# Patient Record
Sex: FEMALE | Race: WHITE | NOT HISPANIC OR LATINO | Employment: FULL TIME | URBAN - NONMETROPOLITAN AREA
[De-identification: names, ages, dates, MRNs, and addresses within clinical notes are randomized per-mention and may not be internally consistent; named-entity substitution may affect disease eponyms.]

---

## 2017-01-03 ENCOUNTER — OFFICE VISIT (OUTPATIENT)
Dept: FAMILY MEDICINE CLINIC | Facility: CLINIC | Age: 47
End: 2017-01-03

## 2017-01-03 VITALS
SYSTOLIC BLOOD PRESSURE: 120 MMHG | DIASTOLIC BLOOD PRESSURE: 80 MMHG | BODY MASS INDEX: 40.12 KG/M2 | HEART RATE: 76 BPM | WEIGHT: 235 LBS | HEIGHT: 64 IN

## 2017-01-03 DIAGNOSIS — F41.1 GENERALIZED ANXIETY DISORDER: Chronic | ICD-10-CM

## 2017-01-03 DIAGNOSIS — F33.1 MODERATE EPISODE OF RECURRENT MAJOR DEPRESSIVE DISORDER (HCC): Primary | Chronic | ICD-10-CM

## 2017-01-03 PROCEDURE — 99214 OFFICE O/P EST MOD 30 MIN: CPT | Performed by: INTERNAL MEDICINE

## 2017-01-03 RX ORDER — ARIPIPRAZOLE 2 MG/1
2 TABLET ORAL DAILY
Qty: 30 TABLET | Refills: 3 | Status: SHIPPED | OUTPATIENT
Start: 2017-01-03 | End: 2017-01-23 | Stop reason: SDUPTHER

## 2017-01-03 RX ORDER — VALACYCLOVIR HYDROCHLORIDE 500 MG/1
TABLET, FILM COATED ORAL
COMMUNITY
Start: 2016-12-11 | End: 2017-05-09 | Stop reason: ALTCHOICE

## 2017-01-03 RX ORDER — DESVENLAFAXINE 50 MG/1
50 TABLET, EXTENDED RELEASE ORAL DAILY
Qty: 30 TABLET | Refills: 5 | Status: SHIPPED | OUTPATIENT
Start: 2017-01-03 | End: 2017-05-25 | Stop reason: SDUPTHER

## 2017-01-03 RX ORDER — LORATADINE 10 MG/1
1 TABLET ORAL DAILY PRN
COMMUNITY
End: 2020-03-12

## 2017-01-03 RX ORDER — TRAZODONE HYDROCHLORIDE 50 MG/1
50 TABLET ORAL NIGHTLY
Qty: 30 TABLET | Refills: 5 | Status: SHIPPED | OUTPATIENT
Start: 2017-01-03 | End: 2017-11-09 | Stop reason: SDUPTHER

## 2017-01-03 RX ORDER — NITROFURANTOIN MACROCRYSTALS 100 MG/1
1 CAPSULE ORAL DAILY
COMMUNITY
Start: 2016-12-01 | End: 2017-03-06

## 2017-01-03 NOTE — PROGRESS NOTES
Subjective   My Hart is a 46 y.o. female who presents to the office for follow-up.  She has anxiety and depression.  She has been having increased episodes of depression and panic attacks.  When I last saw her in October, I started her on fluoxetine and discontinued Wellbutrin.  She also started taking trazodone at bedtime.  She then started seeing a counselor at mental [facility].    She brings in [employer] papers today for completion.  She stopped working on 12/14/2016 she has not yet returned to work.  She continues to have panic attacks.  She does not want to leave the house.  Her family members had to complete her [employer] shopping.  She denies any suicidal or homicidal ideation    History of Present Illness     The following portions of the patient's history were reviewed and updated as appropriate: allergies, current medications, past family history, past medical history, past social history, past surgical history and problem list.    Review of Systems   Constitutional: Negative for chills, fatigue and fever.   HENT: Negative for congestion, sneezing, sore throat and trouble swallowing.    Eyes: Negative for visual disturbance.   Respiratory: Negative for cough, chest tightness, shortness of breath and wheezing.    Cardiovascular: Negative for chest pain, palpitations and leg swelling.   Gastrointestinal: Negative for abdominal pain, constipation, diarrhea, nausea and vomiting.   Genitourinary: Negative for dysuria, frequency and urgency.   Musculoskeletal: Negative for neck pain.   Skin: Negative for rash.   Neurological: Negative for dizziness, weakness and headaches.   Psychiatric/Behavioral: Positive for decreased concentration and sleep disturbance. Negative for suicidal ideas. The patient is nervous/anxious.    All other systems reviewed and are negative.      Objective   Physical Exam   Constitutional: She is oriented to person, place, and time. She appears well-developed and well-nourished.    HENT:   Head: Normocephalic and atraumatic.   Nose: Nose normal.   Mouth/Throat: Oropharynx is clear and moist. No oropharyngeal exudate.   Eyes: Conjunctivae and EOM are normal. Pupils are equal, round, and reactive to light. Right eye exhibits no discharge. Left eye exhibits no discharge. No scleral icterus.   Neck: Normal range of motion. Neck supple. No thyromegaly present.   Cardiovascular: Normal rate, regular rhythm, normal heart sounds and intact distal pulses.  Exam reveals no gallop and no friction rub.    No murmur heard.  Pulmonary/Chest: Effort normal and breath sounds normal. No respiratory distress. She has no wheezes. She has no rales.   Abdominal: Soft. Bowel sounds are normal. She exhibits no distension. There is no tenderness. There is no rebound and no guarding.   Musculoskeletal: She exhibits no edema.   Lymphadenopathy:     She has no cervical adenopathy.   Neurological: She is alert and oriented to person, place, and time. No cranial nerve deficit.   Skin: Skin is warm and dry. No rash noted.   Psychiatric: She has a normal mood and affect. Her behavior is normal. Judgment and thought content normal.   Nursing note and vitals reviewed.      Assessment/Plan   My was seen today for depression.    Diagnoses and all orders for this visit:    Moderate episode of recurrent major depressive disorder  Comments:  UNCONTROLLED  Orders:  -     desvenlafaxine (PRISTIQ) 50 MG 24 hr tablet; Take 1 tablet by mouth Daily.  -     ARIPiprazole (ABILIFY) 2 MG tablet; Take 1 tablet by mouth Daily.  -     traZODone (DESYREL) 50 MG tablet; Take 1 tablet by mouth Every Night.    Generalized anxiety disorder  Comments:  UNCONTROLLED  Orders:  -     desvenlafaxine (PRISTIQ) 50 MG 24 hr tablet; Take 1 tablet by mouth Daily.  -     ARIPiprazole (ABILIFY) 2 MG tablet; Take 1 tablet by mouth Daily.  -     traZODone (DESYREL) 50 MG tablet; Take 1 tablet by mouth Every Night.            I will discontinue the  fluoxetine and start her on Pristiq.  She will continue with trazodone.  I will also start a low-dose of Abilify.  She will continue to follow with mental health.  I will complete FMLA papers secondary to the depression.  Hopefully by adding Abilify, she get back to work within a couple of weeks.  I will see her in 2 weeks for follow-up. She will let me know if her symptoms worsen.    Patients BMI indicates that she is overweight.  I discussed the importance of weight loss, and have recommended a diet and exercise regimen.    PHQ-9 Depression Screen was performed on patient with a score of 17.

## 2017-01-03 NOTE — PATIENT INSTRUCTIONS

## 2017-01-03 NOTE — MR AVS SNAPSHOT
My Hart   1/3/2017 10:00 AM   Office Visit    Dept Phone:  [phone]   Encounter #:  [id number]    Provider:  Guillermo Caal MD   Department:  [facility] PRIMARY CARE [place]                Your Full Care Plan              Today's Medication Changes          These changes are accurate as of: 1/3/17 10:45 AM.  If you have any questions, ask your nurse or doctor.               New Medication(s)Ordered:     ARIPiprazole 2 MG tablet   Commonly known as:  ABILIFY   Take 1 tablet by mouth Daily.   Started by:  Guillermo Caal MD       desvenlafaxine 50 MG 24 hr tablet   Commonly known as:  PRISTIQ   Take 1 tablet by mouth Daily.   Started by:  Guillermo Caal MD         Medication(s)that have changed:     loratadine 10 MG tablet   Commonly known as:  CLARITIN   Take 1 tablet by mouth Daily.   What changed:  Another medication with the same name was removed. Continue taking this medication, and follow the directions you see here.   Changed by:  Guillermo Caal MD         Stop taking medication(s)listed here:     buPROPion  MG 24 hr tablet   Commonly known as:  WELLBUTRIN XL   Stopped by:  Guillermo Caal MD           FLUoxetine 40 MG capsule   Commonly known as:  PROZAC   Stopped by:  Guillermo Caal MD           sulfamethoxazole-trimethoprim 800-160 MG per tablet   Commonly known as:  BACTRIM DS,SEPTRA DS   Stopped by:  Guillermo Caal MD                Where to Get Your Medications      These medications were sent to [address] [phone] [facility] [phone] [address] [address]     Phone:  [phone]     ARIPiprazole 2 MG tablet    desvenlafaxine 50 MG 24 hr tablet    traZODone 50 MG tablet                  Your Updated Medication List          This list is accurate as of: 1/3/17 10:45 AM.  Always use your most recent med list.                ARIPiprazole 2 MG tablet   Commonly known as:  ABILIFY   Take 1 tablet by mouth Daily.       desvenlafaxine 50 MG 24 hr tablet   Commonly known as:  PRISTIQ   Take 1 tablet by mouth Daily.       fluticasone 50 MCG/ACT nasal spray   Commonly known as:  FLONASE       levothyroxine 25 MCG tablet   Commonly known as:  SYNTHROID, LEVOTHROID   Take 1 tablet by mouth daily.       loratadine 10 MG tablet   Commonly known as:  CLARITIN       MUCINEX 600 MG 12 hr tablet   Generic drug:  guaiFENesin       nitrofurantoin 100 MG capsule   Commonly known as:  MACRODANTIN       omeprazole 20 MG capsule   Commonly known as:  priLOSEC   Take 1 capsule by mouth daily.       traZODone 50 MG tablet   Commonly known as:  DESYREL   Take 1 tablet by mouth Every Night.       valACYclovir 500 MG tablet   Commonly known as:  VALTREX               You Were Diagnosed With        Codes Comments    Moderate episode of recurrent major depressive disorder    -  Primary ICD-10-CM: F33.1  ICD-9-CM: 296.32     Generalized anxiety disorder     ICD-10-CM: F41.1  ICD-9-CM: 300.02       Instructions    Exercising to Lose Weight  Exercising can help you to lose weight. In order to lose weight through exercise, you need to do vigorous-intensity exercise. You can tell that you are exercising with vigorous intensity if you are breathing very hard and fast and cannot hold a conversation while exercising.  Moderate-intensity exercise helps to maintain your current weight. You can tell that you are exercising at a moderate level if you have a higher heart rate and faster breathing, but you are still able to hold a conversation.  HOW OFTEN SHOULD I EXERCISE?  Choose an activity that you enjoy and set realistic goals. Your health care provider can help you to make an activity plan that works for you. Exercise regularly as directed by your health care provider. This may include:  · Doing resistance training twice each week, such as:    Push-ups.    Sit-ups.     Lifting weights.    Using resistance bands.  · Doing a given intensity of exercise for a given amount of time. Choose from these options:    150 minutes of moderate-intensity exercise every week.    75 minutes of vigorous-intensity exercise every week.    A mix of moderate-intensity and vigorous-intensity exercise every week.  Children, pregnant women, people who are out of shape, people who are overweight, and older adults may need to consult a health care provider for individual recommendations. If you have any sort of medical condition, be sure to consult your health care provider before starting a new exercise program.  WHAT ARE SOME ACTIVITIES THAT CAN HELP ME TO LOSE WEIGHT?   · Walking at a rate of at least 4.5 miles an hour.  · Jogging or running at a rate of 5 miles per hour.  · Biking at a rate of at least 10 miles per hour.  · Lap swimming.  · Roller-skating or in-line skating.  · Cross-country skiing.  · Vigorous competitive sports, such as football, basketball, and soccer.  · Jumping rope.  · Aerobic dancing.  HOW CAN I BE MORE ACTIVE IN MY DAY-TO-DAY ACTIVITIES?  · Use the stairs instead of the elevator.  · Take a walk during your lunch break.  · If you drive, park your car farther away from work or school.  · If you take public transportation, get off one stop early and walk the rest of the way.  · Make all of your phone calls while standing up and walking around.  · Get up, stretch, and walk around every 30 minutes throughout the day.  WHAT GUIDELINES SHOULD I FOLLOW WHILE EXERCISING?  · Do not exercise so much that you hurt yourself, feel dizzy, or get very short of breath.  · Consult your health care provider prior to starting a new exercise program.  · Wear comfortable clothes and shoes with good support.  · Drink plenty of water while you exercise to prevent dehydration or heat stroke. Body water is lost during exercise and must be replaced.  · Work out until you breathe faster and your heart  "beats faster.     This information is not intended to replace advice given to you by your health care provider. Make sure you discuss any questions you have with your health care provider.     Document Released: 01/20/2012 Document Revised: 01/08/2016 Document Reviewed: 05/21/2015  [employer] Interactive Patient Education ©2016 [employer]       Patient Instructions History      Upcoming Appointments     Visit Type Date Time Department    OFFICE VISIT 1/3/2017 10:00 AM [facility]    FOLLOW UP 1/17/2017  8:30 AM [facility]      MyChart Signup     Our records indicate that you have an active "[employer] account.    You can view your After Visit Summary by going to [employer] and logging in with your Im[employer] username and password.  If you don't have a [employer] username and password but a parent or guardian has access to your record, the parent or guardian should login with their own Im[employer] username and password and access your record to view the After Visit Summary.    If you have questions, you can email [email or web address] or call [phone] to talk to our Imagimod staff.  Remember, Imagimod is NOT to be used for urgent needs.  For medical emergencies, dial 911.               Other Info from Your Visit           Your Appointments     Jan 17, 2017  8:30 AM CST   Follow Up with Guillermo Caal MD   [facility]    [address] [place] [address] [address] [place] [address] [phone]           Arrive 15 minutes prior to appointment.              Allergies     No Known Allergies      Reason for Visit     Depression seeing Lay Powell at [facility]      Vital Signs     Blood Pressure Pulse Height Weight Body Mass Index Smoking Status    120/80 (BP Location: Left arm, Patient Position: Sitting, Cuff Size: Large Adult) 76 64\" (162.6 cm) 235 lb (107 kg) 40.34 kg/m2 Former Smoker      Problems and Diagnoses Noted     Generalized anxiety disorder    " Mood problem

## 2017-01-12 ENCOUNTER — TELEPHONE (OUTPATIENT)
Dept: FAMILY MEDICINE CLINIC | Facility: CLINIC | Age: 47
End: 2017-01-12

## 2017-01-12 NOTE — TELEPHONE ENCOUNTER
"Patient phoned, she is requesting to return to work this coming Monday 01/16/2017. Stated, \" I know I have an appt with  on Tuesday but the medication is working and I would like to go back on Monday, and see him still on Tuesday\".  "

## 2017-01-16 ENCOUNTER — OFFICE VISIT (OUTPATIENT)
Dept: FAMILY MEDICINE CLINIC | Facility: CLINIC | Age: 47
End: 2017-01-16

## 2017-01-16 VITALS
TEMPERATURE: 97.2 F | BODY MASS INDEX: 39.95 KG/M2 | DIASTOLIC BLOOD PRESSURE: 70 MMHG | HEIGHT: 64 IN | HEART RATE: 80 BPM | SYSTOLIC BLOOD PRESSURE: 110 MMHG | WEIGHT: 234 LBS

## 2017-01-16 DIAGNOSIS — J06.9 ACUTE URI: ICD-10-CM

## 2017-01-16 DIAGNOSIS — F33.41 RECURRENT MAJOR DEPRESSIVE DISORDER, IN PARTIAL REMISSION (HCC): Primary | Chronic | ICD-10-CM

## 2017-01-16 DIAGNOSIS — F41.1 GENERALIZED ANXIETY DISORDER: Chronic | ICD-10-CM

## 2017-01-16 DIAGNOSIS — H69.83 EUSTACHIAN TUBE DYSFUNCTION, BILATERAL: ICD-10-CM

## 2017-01-16 PROBLEM — N39.0 CHRONIC URINARY TRACT INFECTION: Chronic | Status: ACTIVE | Noted: 2017-01-16

## 2017-01-16 PROCEDURE — 99214 OFFICE O/P EST MOD 30 MIN: CPT | Performed by: INTERNAL MEDICINE

## 2017-01-16 RX ORDER — CEFDINIR 300 MG/1
300 CAPSULE ORAL 2 TIMES DAILY
Qty: 20 CAPSULE | Refills: 0 | Status: SHIPPED | OUTPATIENT
Start: 2017-01-16 | End: 2017-03-06

## 2017-01-16 NOTE — MR AVS SNAPSHOT
My Hart   1/16/2017 2:00 PM   Office Visit    Dept Phone:  [phone]   Encounter #:  [id number]    Provider:  Guillermo Caal MD   Department:  [facility] PRIMARY CARE [place]                Your Full Care Plan              Today's Medication Changes          These changes are accurate as of: 1/16/17  2:42 PM.  If you have any questions, ask your nurse or doctor.               New Medication(s)Ordered:     cefdinir 300 MG capsule   Commonly known as:  OMNICEF   Take 1 capsule by mouth 2 (Two) Times a Day.   Started by:  Guillermo Caal MD            Where to Get Your Medications      These medications were sent to [facility] - [address] [place] [facility]  - [phone]  - [phone] [address] [place] [address]     Phone:  [phone]     cefdinir 300 MG capsule                  Your Updated Medication List          This list is accurate as of: 1/16/17  2:42 PM.  Always use your most recent med list.                ARIPiprazole 2 MG tablet   Commonly known as:  ABILIFY   Take 1 tablet by mouth Daily.       cefdinir 300 MG capsule   Commonly known as:  OMNICEF   Take 1 capsule by mouth 2 (Two) Times a Day.       desvenlafaxine 50 MG 24 hr tablet   Commonly known as:  PRISTIQ   Take 1 tablet by mouth Daily.       fluticasone 50 MCG/ACT nasal spray   Commonly known as:  FLONASE       levothyroxine 25 MCG tablet   Commonly known as:  SYNTHROID, LEVOTHROID   Take 1 tablet by mouth daily.       loratadine 10 MG tablet   Commonly known as:  CLARITIN       MUCINEX 600 MG 12 hr tablet   Generic drug:  guaiFENesin       nitrofurantoin 100 MG capsule   Commonly known as:  MACRODANTIN       omeprazole 20 MG capsule   Commonly known as:  priLOSEC   Take 1 capsule by mouth daily.       traZODone 50 MG tablet   Commonly known as:  DESYREL   Take 1 tablet by mouth Every Night.       valACYclovir 500 MG tablet   Commonly known  as:  VALTREX               You Were Diagnosed With        Codes Comments    Recurrent major depressive disorder, in partial remission    -  Primary ICD-10-CM: F33.41  ICD-9-CM: 296.35     Generalized anxiety disorder     ICD-10-CM: F41.1  ICD-9-CM: 300.02     Eustachian tube dysfunction, bilateral     ICD-10-CM: H69.83  ICD-9-CM: 381.81     Acute URI     ICD-10-CM: J06.9  ICD-9-CM: 465.9       Instructions    Exercising to Lose Weight  Exercising can help you to lose weight. In order to lose weight through exercise, you need to do vigorous-intensity exercise. You can tell that you are exercising with vigorous intensity if you are breathing very hard and fast and cannot hold a conversation while exercising.  Moderate-intensity exercise helps to maintain your current weight. You can tell that you are exercising at a moderate level if you have a higher heart rate and faster breathing, but you are still able to hold a conversation.  HOW OFTEN SHOULD I EXERCISE?  Choose an activity that you enjoy and set realistic goals. Your health care provider can help you to make an activity plan that works for you. Exercise regularly as directed by your health care provider. This may include:  · Doing resistance training twice each week, such as:    Push-ups.    Sit-ups.    Lifting weights.    Using resistance bands.  · Doing a given intensity of exercise for a given amount of time. Choose from these options:    150 minutes of moderate-intensity exercise every week.    75 minutes of vigorous-intensity exercise every week.    A mix of moderate-intensity and vigorous-intensity exercise every week.  Children, pregnant women, people who are out of shape, people who are overweight, and older adults may need to consult a health care provider for individual recommendations. If you have any sort of medical condition, be sure to consult your health care provider before starting a new exercise program.  WHAT ARE SOME ACTIVITIES THAT CAN HELP  ME TO LOSE WEIGHT?   · Walking at a rate of at least 4.5 miles an hour.  · Jogging or running at a rate of 5 miles per hour.  · Biking at a rate of at least 10 miles per hour.  · Lap swimming.  · Roller-skating or in-line skating.  · Cross-country skiing.  · Vigorous competitive sports, such as football, basketball, and soccer.  · Jumping rope.  · Aerobic dancing.  HOW CAN I BE MORE ACTIVE IN MY DAY-TO-DAY ACTIVITIES?  · Use the stairs instead of the elevator.  · Take a walk during your lunch break.  · If you drive, park your car farther away from work or school.  · If you take public transportation, get off one stop early and walk the rest of the way.  · Make all of your phone calls while standing up and walking around.  · Get up, stretch, and walk around every 30 minutes throughout the day.  WHAT GUIDELINES SHOULD I FOLLOW WHILE EXERCISING?  · Do not exercise so much that you hurt yourself, feel dizzy, or get very short of breath.  · Consult your health care provider prior to starting a new exercise program.  · Wear comfortable clothes and shoes with good support.  · Drink plenty of water while you exercise to prevent dehydration or heat stroke. Body water is lost during exercise and must be replaced.  · Work out until you breathe faster and your heart beats faster.     This information is not intended to replace advice given to you by your health care provider. Make sure you discuss any questions you have with your health care provider.     Document Released: 01/20/2012 Document Revised: 01/08/2016 Document Reviewed: 05/21/2015  [employer] Interactive Patient Education ©2016 [employer]       Patient Instructions History      Upcoming Appointments     Visit Type Date Time Department    FOLLOW UP 1/16/2017  2:00 PM [facility] [place] [facility]    FOLLOW UP 2/27/2017  3:00 PM [facility] Signup     Our records indicate that you have an active [employer] account.    You can view your After Visit  "Summary by going to [employer] and logging in with your CompassMD username and password.  If you don't have a CompassMD username and password but a parent or guardian has access to your record, the parent or guardian should login with their own CompassMD username and password and access your record to view the After Visit Summary.    If you have questions, you can email [employer] [email or web address] or call [phone] to talk to our CompassMD staff.  Remember, CompassMD is NOT to be used for urgent needs.  For medical emergencies, dial 911.               Other Info from Your Visit           Your Appointments     Feb 27, 2017  3:00 PM CST   Follow Up with Guillermo Caal MD   [facility]    [address] [place] [address] [address] [place] [address] [phone]           Arrive 15 minutes prior to appointment.              Allergies     No Known Allergies      Reason for Visit     Depression follow up on new med    Sore Throat     Earache left      Vital Signs     Blood Pressure Pulse Temperature Height Weight Body Mass Index    110/70 (BP Location: Left arm, Patient Position: Sitting, Cuff Size: Large Adult) 80 97.2 °F (36.2 °C) (Oral) 64\" (162.6 cm) 234 lb (106 kg) 40.17 kg/m2    Smoking Status                   Former Smoker           Problems and Diagnoses Noted     Chronic urinary tract infection    Generalized anxiety disorder    Mood problem    Eustachian tube dysfunction        Acute upper respiratory infection            "

## 2017-01-16 NOTE — PATIENT INSTRUCTIONS

## 2017-01-16 NOTE — PROGRESS NOTES
Subjective   My Hart is a 46 y.o. female who presents to the office for follow-up.  I saw HER-2 weeks ago for follow-up on her depression and anxiety.  I discontinued fluoxetine and started Pristiq.  I also started her on a low-dose of Abilify.  She continues to use trazodone to help with insomnia.  She is feeling much better.  She noticed improvement almost immediately upon starting the new medications.  She is planning to return to work tonight.    She also complains of a sore throat and nasal congestion and pressure in the left ear.  This started a few days ago.  History of Present Illness     The following portions of the patient's history were reviewed and updated as appropriate: allergies, current medications, past family history, past medical history, past social history, past surgical history and problem list.    Review of Systems   Constitutional: Negative for chills, fatigue and fever.   HENT: Positive for congestion, ear pain and sore throat. Negative for sneezing and trouble swallowing.    Eyes: Negative for visual disturbance.   Respiratory: Negative for cough, chest tightness, shortness of breath and wheezing.    Cardiovascular: Negative for chest pain, palpitations and leg swelling.   Gastrointestinal: Negative for abdominal pain, constipation, diarrhea, nausea and vomiting.   Genitourinary: Negative for dysuria, frequency and urgency.   Musculoskeletal: Negative for neck pain.   Skin: Negative for rash.   Neurological: Negative for dizziness, weakness and headaches.   Psychiatric/Behavioral:        Patient denies any feelings of depression and has not felt down, hopeless or lost interest in any activities.   All other systems reviewed and are negative.      Objective   Physical Exam   Constitutional: She is oriented to person, place, and time. She appears well-developed and well-nourished.   HENT:   Head: Normocephalic and atraumatic.   Nose: Nose normal.   Mouth/Throat: Oropharynx is  clear and moist. No oropharyngeal exudate.   TMs are retracted bilaterally, with mild effusion, worse on the left.  Nose is congested.  Oropharynx is erythematous with posterior drainage.   Eyes: Conjunctivae and EOM are normal. Pupils are equal, round, and reactive to light. Right eye exhibits no discharge. Left eye exhibits no discharge. No scleral icterus.   Neck: Normal range of motion. Neck supple. No thyromegaly present.   Cardiovascular: Normal rate, regular rhythm, normal heart sounds and intact distal pulses.  Exam reveals no gallop and no friction rub.    No murmur heard.  Pulmonary/Chest: Effort normal and breath sounds normal. No respiratory distress. She has no wheezes. She has no rales.   Abdominal: Soft. Bowel sounds are normal. She exhibits no distension. There is no tenderness. There is no rebound and no guarding.   Musculoskeletal: She exhibits no edema.   Lymphadenopathy:     She has no cervical adenopathy.   Neurological: She is alert and oriented to person, place, and time. No cranial nerve deficit.   Skin: Skin is warm and dry. No rash noted.   Psychiatric: She has a normal mood and affect. Her behavior is normal. Judgment and thought content normal.   Nursing note and vitals reviewed.      Assessment/Plan   My was seen today for depression, sore throat and earache.    Diagnoses and all orders for this visit:    Recurrent major depressive disorder, in partial remission    Generalized anxiety disorder    Eustachian tube dysfunction, bilateral    Acute URI  -     cefdinir (OMNICEF) 300 MG capsule; Take 1 capsule by mouth 2 (Two) Times a Day.         She is given Omnicef for treatment of the URI.  She will continue with Flonase nasal spray and Mucinex for the eustachian tube dysfunction.    PHQ-9 Depression Screen was performed on patient with a score of 3 .  She will continue with Pristiq, Abilify, and trazodone.  I will follow-up with her in one month.  If she is doing well at that time,  may try discontinuing the Abilify.  She will return to work tonight with no restrictions.    Patients BMI indicates that she is overweight.  I discussed the importance of weight loss, and have recommended a diet and exercise regimen.

## 2017-01-23 DIAGNOSIS — E03.9 ACQUIRED HYPOTHYROIDISM: ICD-10-CM

## 2017-01-23 DIAGNOSIS — F41.1 GENERALIZED ANXIETY DISORDER: Chronic | ICD-10-CM

## 2017-01-23 DIAGNOSIS — F33.1 MODERATE EPISODE OF RECURRENT MAJOR DEPRESSIVE DISORDER (HCC): Chronic | ICD-10-CM

## 2017-01-23 RX ORDER — LEVOTHYROXINE SODIUM 0.03 MG/1
25 TABLET ORAL DAILY
Qty: 90 TABLET | Refills: 3 | Status: SHIPPED | OUTPATIENT
Start: 2017-01-23 | End: 2017-05-09 | Stop reason: SDUPTHER

## 2017-01-23 RX ORDER — ARIPIPRAZOLE 2 MG/1
2 TABLET ORAL DAILY
Qty: 90 TABLET | Refills: 3 | Status: SHIPPED | OUTPATIENT
Start: 2017-01-23 | End: 2017-05-25 | Stop reason: SDUPTHER

## 2017-05-08 ENCOUNTER — LAB (OUTPATIENT)
Dept: LAB | Facility: OTHER | Age: 47
End: 2017-05-08

## 2017-05-08 DIAGNOSIS — E78.5 HYPERLIPIDEMIA, UNSPECIFIED HYPERLIPIDEMIA TYPE: ICD-10-CM

## 2017-05-08 DIAGNOSIS — Z79.899 HIGH RISK MEDICATION USE: ICD-10-CM

## 2017-05-08 DIAGNOSIS — E03.9 HYPOTHYROIDISM (ACQUIRED): ICD-10-CM

## 2017-05-08 DIAGNOSIS — E03.9 HYPOTHYROIDISM (ACQUIRED): Primary | ICD-10-CM

## 2017-05-08 LAB
ALBUMIN SERPL-MCNC: 3.7 G/DL (ref 3.2–5.5)
ALBUMIN/GLOB SERPL: 1.3 G/DL (ref 1–3)
ALP SERPL-CCNC: 60 U/L (ref 15–121)
ALT SERPL W P-5'-P-CCNC: 15 U/L (ref 10–60)
ANION GAP SERPL CALCULATED.3IONS-SCNC: 9 MMOL/L (ref 5–15)
AST SERPL-CCNC: 17 U/L (ref 10–60)
BASOPHILS # BLD AUTO: 0.03 10*3/MM3 (ref 0–0.2)
BASOPHILS NFR BLD AUTO: 0.4 % (ref 0–2)
BILIRUB SERPL-MCNC: <0.3 MG/DL (ref 0.2–1)
BILIRUB UR QL STRIP: NEGATIVE
BUN BLD-MCNC: 11 MG/DL (ref 8–25)
BUN/CREAT SERPL: 10 (ref 7–25)
CALCIUM SPEC-SCNC: 8.4 MG/DL (ref 8.4–10.8)
CHLORIDE SERPL-SCNC: 107 MMOL/L (ref 100–112)
CHOLEST SERPL-MCNC: 210 MG/DL (ref 150–200)
CLARITY UR: ABNORMAL
CO2 SERPL-SCNC: 24 MMOL/L (ref 20–32)
COLOR UR: YELLOW
CREAT BLD-MCNC: 1.1 MG/DL (ref 0.4–1.3)
DEPRECATED RDW RBC AUTO: 43.3 FL (ref 36.4–46.3)
EOSINOPHIL # BLD AUTO: 0.21 10*3/MM3 (ref 0–0.7)
EOSINOPHIL NFR BLD AUTO: 2.8 % (ref 0–7)
ERYTHROCYTE [DISTWIDTH] IN BLOOD BY AUTOMATED COUNT: 12.9 % (ref 11.5–14.5)
GFR SERPL CREATININE-BSD FRML MDRD: 54 ML/MIN/1.73 (ref 55–135)
GLOBULIN UR ELPH-MCNC: 2.9 GM/DL (ref 2.5–4.6)
GLUCOSE BLD-MCNC: 101 MG/DL (ref 70–100)
GLUCOSE UR STRIP-MCNC: NEGATIVE MG/DL
HCT VFR BLD AUTO: 38.7 % (ref 35–45)
HDLC SERPL-MCNC: 48 MG/DL (ref 35–100)
HGB BLD-MCNC: 12.8 G/DL (ref 12–15.5)
HGB UR QL STRIP.AUTO: NEGATIVE
KETONES UR QL STRIP: NEGATIVE
LDLC SERPL CALC-MCNC: 138 MG/DL
LDLC/HDLC SERPL: 2.88 {RATIO}
LEUKOCYTE ESTERASE UR QL STRIP.AUTO: NEGATIVE
LYMPHOCYTES # BLD AUTO: 3.11 10*3/MM3 (ref 0.6–4.2)
LYMPHOCYTES NFR BLD AUTO: 40.9 % (ref 10–50)
MCH RBC QN AUTO: 31.1 PG (ref 26.5–34)
MCHC RBC AUTO-ENTMCNC: 33.1 G/DL (ref 31.4–36)
MCV RBC AUTO: 94.2 FL (ref 80–98)
MONOCYTES # BLD AUTO: 0.67 10*3/MM3 (ref 0–0.9)
MONOCYTES NFR BLD AUTO: 8.8 % (ref 0–12)
NEUTROPHILS # BLD AUTO: 3.59 10*3/MM3 (ref 2–8.6)
NEUTROPHILS NFR BLD AUTO: 47.1 % (ref 37–80)
NITRITE UR QL STRIP: NEGATIVE
PH UR STRIP.AUTO: 5.5 [PH] (ref 5.5–8)
PLATELET # BLD AUTO: 329 10*3/MM3 (ref 150–450)
PMV BLD AUTO: 9.5 FL (ref 8–12)
POTASSIUM BLD-SCNC: 4.4 MMOL/L (ref 3.4–5.4)
PROT SERPL-MCNC: 6.6 G/DL (ref 6.7–8.2)
PROT UR QL STRIP: NEGATIVE
RBC # BLD AUTO: 4.11 10*6/MM3 (ref 3.77–5.16)
SODIUM BLD-SCNC: 140 MMOL/L (ref 134–146)
SP GR UR STRIP: 1.02 (ref 1–1.03)
TRIGL SERPL-MCNC: 119 MG/DL (ref 35–160)
UROBILINOGEN UR QL STRIP: ABNORMAL
VLDLC SERPL-MCNC: 23.8 MG/DL
WBC NRBC COR # BLD: 7.61 10*3/MM3 (ref 3.2–9.8)

## 2017-05-08 PROCEDURE — 84443 ASSAY THYROID STIM HORMONE: CPT | Performed by: INTERNAL MEDICINE

## 2017-05-08 PROCEDURE — 84439 ASSAY OF FREE THYROXINE: CPT | Performed by: INTERNAL MEDICINE

## 2017-05-08 PROCEDURE — 80053 COMPREHEN METABOLIC PANEL: CPT | Performed by: INTERNAL MEDICINE

## 2017-05-08 PROCEDURE — 85025 COMPLETE CBC W/AUTO DIFF WBC: CPT | Performed by: INTERNAL MEDICINE

## 2017-05-08 PROCEDURE — 81003 URINALYSIS AUTO W/O SCOPE: CPT | Performed by: INTERNAL MEDICINE

## 2017-05-08 PROCEDURE — 36415 COLL VENOUS BLD VENIPUNCTURE: CPT | Performed by: INTERNAL MEDICINE

## 2017-05-08 PROCEDURE — 80061 LIPID PANEL: CPT | Performed by: INTERNAL MEDICINE

## 2017-05-09 ENCOUNTER — OFFICE VISIT (OUTPATIENT)
Dept: FAMILY MEDICINE CLINIC | Facility: CLINIC | Age: 47
End: 2017-05-09

## 2017-05-09 VITALS
DIASTOLIC BLOOD PRESSURE: 76 MMHG | HEART RATE: 70 BPM | SYSTOLIC BLOOD PRESSURE: 110 MMHG | WEIGHT: 248 LBS | BODY MASS INDEX: 42.34 KG/M2 | HEIGHT: 64 IN

## 2017-05-09 DIAGNOSIS — E03.9 HYPOTHYROIDISM (ACQUIRED): Primary | ICD-10-CM

## 2017-05-09 DIAGNOSIS — E78.2 MIXED HYPERLIPIDEMIA: Chronic | ICD-10-CM

## 2017-05-09 DIAGNOSIS — F33.42 RECURRENT MAJOR DEPRESSIVE DISORDER, IN FULL REMISSION (HCC): Chronic | ICD-10-CM

## 2017-05-09 DIAGNOSIS — R53.83 OTHER FATIGUE: ICD-10-CM

## 2017-05-09 DIAGNOSIS — R73.02 IMPAIRED GLUCOSE TOLERANCE: Primary | Chronic | ICD-10-CM

## 2017-05-09 DIAGNOSIS — E03.9 ACQUIRED HYPOTHYROIDISM: Chronic | ICD-10-CM

## 2017-05-09 DIAGNOSIS — R06.83 SNORING: ICD-10-CM

## 2017-05-09 LAB
T4 FREE SERPL-MCNC: 1.18 NG/DL (ref 0.78–2.19)
TSH SERPL DL<=0.05 MIU/L-ACNC: 6.31 MIU/ML (ref 0.46–4.68)

## 2017-05-09 PROCEDURE — 99214 OFFICE O/P EST MOD 30 MIN: CPT | Performed by: INTERNAL MEDICINE

## 2017-05-09 RX ORDER — LEVOTHYROXINE SODIUM 0.05 MG/1
50 TABLET ORAL DAILY
Qty: 90 TABLET | Refills: 3 | Status: SHIPPED | OUTPATIENT
Start: 2017-05-09 | End: 2017-06-26 | Stop reason: DRUGHIGH

## 2017-05-09 RX ORDER — ACYCLOVIR 400 MG/1
400 TABLET ORAL 2 TIMES DAILY
COMMUNITY
End: 2020-07-28 | Stop reason: SDUPTHER

## 2017-05-09 RX ORDER — NITROFURANTOIN 25; 75 MG/1; MG/1
100 CAPSULE ORAL DAILY
COMMUNITY
End: 2017-08-07 | Stop reason: SDUPTHER

## 2017-05-25 ENCOUNTER — OFFICE VISIT (OUTPATIENT)
Dept: FAMILY MEDICINE CLINIC | Facility: CLINIC | Age: 47
End: 2017-05-25

## 2017-05-25 VITALS
HEIGHT: 64 IN | WEIGHT: 245 LBS | RESPIRATION RATE: 16 BRPM | SYSTOLIC BLOOD PRESSURE: 120 MMHG | BODY MASS INDEX: 41.83 KG/M2 | DIASTOLIC BLOOD PRESSURE: 82 MMHG

## 2017-05-25 DIAGNOSIS — F41.1 GENERALIZED ANXIETY DISORDER: Chronic | ICD-10-CM

## 2017-05-25 DIAGNOSIS — F33.1 MODERATE EPISODE OF RECURRENT MAJOR DEPRESSIVE DISORDER (HCC): Primary | Chronic | ICD-10-CM

## 2017-05-25 PROCEDURE — 99213 OFFICE O/P EST LOW 20 MIN: CPT | Performed by: INTERNAL MEDICINE

## 2017-05-25 RX ORDER — DESVENLAFAXINE 100 MG/1
100 TABLET, EXTENDED RELEASE ORAL DAILY
Qty: 30 TABLET | Refills: 5 | Status: SHIPPED | OUTPATIENT
Start: 2017-05-25 | End: 2020-03-12

## 2017-05-25 RX ORDER — ARIPIPRAZOLE 5 MG/1
5 TABLET ORAL DAILY
Qty: 30 TABLET | Refills: 5 | Status: SHIPPED | OUTPATIENT
Start: 2017-05-25 | End: 2017-06-23 | Stop reason: DRUGHIGH

## 2017-06-23 ENCOUNTER — OFFICE VISIT (OUTPATIENT)
Dept: FAMILY MEDICINE CLINIC | Facility: CLINIC | Age: 47
End: 2017-06-23

## 2017-06-23 ENCOUNTER — LAB (OUTPATIENT)
Dept: LAB | Facility: OTHER | Age: 47
End: 2017-06-23

## 2017-06-23 VITALS
RESPIRATION RATE: 16 BRPM | OXYGEN SATURATION: 100 % | BODY MASS INDEX: 41.83 KG/M2 | HEART RATE: 71 BPM | SYSTOLIC BLOOD PRESSURE: 122 MMHG | DIASTOLIC BLOOD PRESSURE: 80 MMHG | HEIGHT: 64 IN | WEIGHT: 245 LBS

## 2017-06-23 DIAGNOSIS — Z00.00 ENCOUNTER FOR WELLNESS EXAMINATION: Primary | ICD-10-CM

## 2017-06-23 DIAGNOSIS — E78.2 MIXED HYPERLIPIDEMIA: Chronic | ICD-10-CM

## 2017-06-23 DIAGNOSIS — Z00.00 ENCOUNTER FOR WELLNESS EXAMINATION: ICD-10-CM

## 2017-06-23 DIAGNOSIS — E03.9 HYPOTHYROIDISM (ACQUIRED): ICD-10-CM

## 2017-06-23 LAB
BILIRUB UR QL STRIP: NEGATIVE
CLARITY UR: CLEAR
COLOR UR: YELLOW
GLUCOSE UR STRIP-MCNC: NEGATIVE MG/DL
HBV SURFACE AB SER QL: 32.5 (ref 0–4.99)
HBV SURFACE AB SER RIA-ACNC: ABNORMAL
HGB UR QL STRIP.AUTO: NEGATIVE
KETONES UR QL STRIP: NEGATIVE
LEUKOCYTE ESTERASE UR QL STRIP.AUTO: NEGATIVE
NITRITE UR QL STRIP: NEGATIVE
PH UR STRIP.AUTO: 6 [PH] (ref 5.5–8)
PROT UR QL STRIP: NEGATIVE
SP GR UR STRIP: <=1.005 (ref 1–1.03)
TSH SERPL DL<=0.05 MIU/L-ACNC: 4.74 MIU/ML (ref 0.46–4.68)
UROBILINOGEN UR QL STRIP: NORMAL

## 2017-06-23 PROCEDURE — 84443 ASSAY THYROID STIM HORMONE: CPT | Performed by: INTERNAL MEDICINE

## 2017-06-23 PROCEDURE — 99396 PREV VISIT EST AGE 40-64: CPT | Performed by: INTERNAL MEDICINE

## 2017-06-23 PROCEDURE — 86706 HEP B SURFACE ANTIBODY: CPT | Performed by: INTERNAL MEDICINE

## 2017-06-23 RX ORDER — ARIPIPRAZOLE 2 MG/1
2 TABLET ORAL DAILY
COMMUNITY
End: 2017-08-31 | Stop reason: DRUGHIGH

## 2017-06-23 NOTE — PATIENT INSTRUCTIONS

## 2017-06-23 NOTE — PROGRESS NOTES
Subjective   My Hart is a 46 y.o. female who presents to the office for a wellness exam.  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  She brings physical examination form sent to be completed.  She also needs immunization titers checked.  Otherwise she has been feeling well and voices no complaints today.  History of Present Illness     The following portions of the patient's history were reviewed and updated as appropriate: allergies, current medications, past family history, past medical history, past social history, past surgical history and problem list.    Review of Systems   Constitutional: Negative for chills, fatigue and fever.   HENT: Negative for congestion, sneezing, sore throat and trouble swallowing.    Eyes: Negative for visual disturbance.   Respiratory: Negative for cough, chest tightness, shortness of breath and wheezing.    Cardiovascular: Negative for chest pain, palpitations and leg swelling.   Gastrointestinal: Negative for abdominal pain, constipation, diarrhea, nausea and vomiting.   Genitourinary: Negative for dysuria, frequency and urgency.   Musculoskeletal: Negative for neck pain.   Skin: Negative for rash.   Neurological: Negative for dizziness, weakness and headaches.   Psychiatric/Behavioral:        Patient denies any feelings of depression and has not felt down, hopeless or lost interest in any activities.   All other systems reviewed and are negative.      Objective   Physical Exam   Constitutional: She is oriented to person, place, and time. She appears well-developed and well-nourished.   HENT:   Head: Normocephalic and atraumatic.   Nose: Nose normal.   Mouth/Throat: Oropharynx is clear and moist. No oropharyngeal exudate.   Eyes: Conjunctivae and EOM are normal. Pupils are equal, round, and reactive to light. Right eye exhibits no discharge. Left eye exhibits no discharge. No scleral  icterus.   Neck: Normal range of motion. Neck supple. No thyromegaly present.   Cardiovascular: Normal rate, regular rhythm, normal heart sounds and intact distal pulses.  Exam reveals no gallop and no friction rub.    No murmur heard.  Pulmonary/Chest: Effort normal and breath sounds normal. No respiratory distress. She has no wheezes. She has no rales.   Abdominal: Soft. Bowel sounds are normal. She exhibits no distension. There is no tenderness. There is no rebound and no guarding.   Musculoskeletal: She exhibits no edema.   Lymphadenopathy:     She has no cervical adenopathy.   Neurological: She is alert and oriented to person, place, and time. No cranial nerve deficit.   Skin: Skin is warm and dry. No rash noted.   Psychiatric: She has a normal mood and affect. Her behavior is normal. Judgment and thought content normal.   Nursing note and vitals reviewed.      Assessment/Plan   My was seen today for college physical.    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  I will check titer levels for MMR, Varicella and hepatitis B

## 2017-06-26 LAB
MEV IGG SER IA-ACNC: 54.6 AU/ML
MUV IGG SER IA-ACNC: 32.1 AU/ML
RUBV IGG SERPL IA-ACNC: 3.3 INDEX
VZV IGG SER IA-ACNC: 759 INDEX
VZV IGM SER IA-ACNC: <0.91 INDEX (ref 0–0.9)

## 2017-06-26 RX ORDER — LEVOTHYROXINE SODIUM 0.07 MG/1
75 TABLET ORAL DAILY
Qty: 90 TABLET | Refills: 0 | Status: SHIPPED | OUTPATIENT
Start: 2017-06-26 | End: 2017-11-07 | Stop reason: SDUPTHER

## 2017-07-12 ENCOUNTER — CONSULT (OUTPATIENT)
Dept: SLEEP MEDICINE | Facility: HOSPITAL | Age: 47
End: 2017-07-12

## 2017-07-12 VITALS
SYSTOLIC BLOOD PRESSURE: 124 MMHG | DIASTOLIC BLOOD PRESSURE: 80 MMHG | BODY MASS INDEX: 43.02 KG/M2 | HEART RATE: 84 BPM | OXYGEN SATURATION: 97 % | WEIGHT: 252 LBS | HEIGHT: 64 IN

## 2017-07-12 DIAGNOSIS — G47.8 LONG SLEEPER SYNDROME: ICD-10-CM

## 2017-07-12 DIAGNOSIS — F32.A DEPRESSION, UNSPECIFIED DEPRESSION TYPE: ICD-10-CM

## 2017-07-12 DIAGNOSIS — G47.10 HYPERSOMNIA: Primary | ICD-10-CM

## 2017-07-12 PROCEDURE — 99204 OFFICE O/P NEW MOD 45 MIN: CPT | Performed by: INTERNAL MEDICINE

## 2017-07-12 NOTE — PROGRESS NOTES
New patient Sleep Consultation    Encounter Date: 7/12/2017         Patient's PCP: Guillermo Caal MD  Referring provider: Guillermo Caal MD  Reason for consultation: excessive daytime sleepiness    My Hart is a 46 y.o. female who complains of wanting to sleep 12-14 hours per day. She has jerking and snoring that is witnessed and witnessed apneas. She has had this for years. She can take 3 hours naps if left to her devices. She feels well rested afterwards. She was started on trazodone after a depressive episode in fall 2016. She has been off for ~ 1 month without any change in her sleepiness.    Sleep history:    Bed time: 2130  Sleep latency: 5 minutes  Number of times awakens during the night: 2  Wake time: 0330 on work days, 10 am on days off  Estimated total sleep time at night: 7-12 hours   Caffeine intake: 1 pot a day in the morning  Alcohol intake: 2/week  Nap time: everyday if not working  Driving: sleepiness with long drives  She denies abnormal dreams, sleep paralysis, hypnagogic hallucinations, RLS sx, cataplexy symptoms. She had a lot of sleep walking as a child.  Tonsillectomy: No    Weight Issues:  Weight recorded over past 5 years:   unchanged      Past Medical History:   Diagnosis Date   • Allergic    • Allergic rhinitis    • Depression    • GERD (gastroesophageal reflux disease)    • History of colon polyps    • Hyperlipidemia    • Hypothyroidism    • Impaired glucose tolerance    • Non-alcoholic fatty liver disease    • Tuberculosis    • URI (upper respiratory infection)    • UTI (urinary tract infection)      Social History     Social History   • Marital status:      Spouse name: N/A   • Number of children: N/A   • Years of education: N/A     Occupational History   • Not on file.     Social History Main Topics   • Smoking status: Former Smoker     Quit date: 1994   • Smokeless tobacco: Never Used   • Alcohol use Yes      Comment: occasional   • Drug use: No   • Sexual  activity: Defer     Other Topics Concern   • Not on file     Social History Narrative     Family History   Problem Relation Age of Onset   • Heart disease Mother    • Clotting disorder Mother    • Thyroid disease Sister    • Heart disease Brother    • Cancer Maternal Grandmother      lung   • Diabetes Paternal Grandmother    • Thyroid disease Sister    • Heart disease Sister        Smoking history: smoked 1/2 ppds from age 16 until 24  FH of sleep disorders: last of her 3 kids had night terrors, now with sleep walking    Review of Systems:  Pertinent items are noted in HPI.  (+) sleepiness, depression well controlled    Patient advised to discuss any positive ROS with PCP.      Vitals:    07/12/17 1357   BP: 124/80   Pulse: 84   SpO2: 97%     Body mass index is 43.26 kg/(m^2).  [place] - 14    Gen:  No distress, appears stated age, alert, oriented to person, place, and time  Heent:   NC/AT, PERRLA, EOMI, anicteric sclera    External ears normal, OP clear, Mallamati 3    Nose: patent  NECK:  Supple, midline trachea, no palpable goiter  LUNGS: Clear breath sounds bilaterally, nonlabored breathing  CV:  Normal S1/S2, without murmur, no peripheral edema  ABD:  Non tender, no enlarged liver or masses, Bowel sounds not appreciated  EXT:  No cyanosis or clubbing    Past Medical History:   Diagnosis Date   • Allergic    • Allergic rhinitis    • Depression    • GERD (gastroesophageal reflux disease)    • History of colon polyps    • Hyperlipidemia    • Hypothyroidism    • Impaired glucose tolerance    • Non-alcoholic fatty liver disease    • Tuberculosis    • URI (upper respiratory infection)    • UTI (urinary tract infection)        Current Outpatient Prescriptions:   •  acyclovir (ZOVIRAX) 400 MG tablet, Take 400 mg by mouth 2 (Two) Times a Day. Take no more than 5 doses a day., Disp: , Rfl:   •  ARIPiprazole (ABILIFY) 2 MG tablet, Take 2 mg by mouth Daily., Disp: , Rfl:   •  desvenlafaxine (PRISTIQ) 100 MG 24 hr tablet,  Take 1 tablet by mouth Daily., Disp: 30 tablet, Rfl: 5  •  fluticasone (FLONASE) 50 MCG/ACT nasal spray, 1 spray into each nostril daily. Administer 2 sprays in each nostril for each dose., Disp: , Rfl:   •  guaiFENesin (MUCINEX) 600 MG 12 hr tablet, Take 600 mg by mouth Daily As Needed., Disp: , Rfl:   •  levothyroxine (SYNTHROID, LEVOTHROID) 75 MCG tablet, Take 1 tablet by mouth Daily., Disp: 90 tablet, Rfl: 0  •  loratadine (CLARITIN) 10 MG tablet, Take 1 tablet by mouth Daily As Needed., Disp: , Rfl:   •  nitrofurantoin, macrocrystal-monohydrate, (MACROBID) 100 MG capsule, Take 100 mg by mouth Daily., Disp: , Rfl:   •  omeprazole (PriLOSEC) 20 MG capsule, Take 1 capsule by mouth daily., Disp: 90 capsule, Rfl: 3  •  traZODone (DESYREL) 50 MG tablet, Take 1 tablet by mouth Every Night., Disp: 30 tablet, Rfl: 5    ASSESSMENT:  1. Hypersomnia - Possible obstructive sleep apnea vs. Long sleeper vs. Secondary to medication or psychiatric illness  2. Depression - on abilify and pristiq. Well controlled    PLAN:  1. In laboratory polysomnography testing    2. RTC in 1 week after PSG. Consider autocpap if (+)  3. Sleep logs      Boubacar Viveros MD        CC: MD Afua Stanford Brian W, MD

## 2017-07-21 ENCOUNTER — APPOINTMENT (OUTPATIENT)
Dept: SLEEP MEDICINE | Facility: HOSPITAL | Age: 47
End: 2017-07-21
Attending: INTERNAL MEDICINE

## 2017-07-28 ENCOUNTER — HOSPITAL ENCOUNTER (OUTPATIENT)
Dept: SLEEP MEDICINE | Facility: HOSPITAL | Age: 47
Discharge: HOME | End: 2017-07-28
Attending: INTERNAL MEDICINE | Admitting: INTERNAL MEDICINE

## 2017-07-28 VITALS — HEIGHT: 64 IN | BODY MASS INDEX: 43.02 KG/M2 | WEIGHT: 252 LBS

## 2017-07-28 DIAGNOSIS — G47.10 HYPERSOMNIA: ICD-10-CM

## 2017-07-28 PROCEDURE — 95810 POLYSOM 6/> YRS 4/> PARAM: CPT

## 2017-07-28 PROCEDURE — 95810 POLYSOM 6/> YRS 4/> PARAM: CPT | Performed by: INTERNAL MEDICINE

## 2017-08-01 ENCOUNTER — OFFICE VISIT (OUTPATIENT)
Dept: SLEEP MEDICINE | Facility: HOSPITAL | Age: 47
End: 2017-08-01

## 2017-08-01 VITALS
BODY MASS INDEX: 43.36 KG/M2 | SYSTOLIC BLOOD PRESSURE: 124 MMHG | DIASTOLIC BLOOD PRESSURE: 74 MMHG | HEIGHT: 64 IN | WEIGHT: 254 LBS

## 2017-08-01 DIAGNOSIS — G47.33 OSA (OBSTRUCTIVE SLEEP APNEA): Primary | ICD-10-CM

## 2017-08-01 PROCEDURE — 99212 OFFICE O/P EST SF 10 MIN: CPT | Performed by: INTERNAL MEDICINE

## 2017-08-01 NOTE — PROGRESS NOTES
CHIEF COMPLAINT: follow up sleep study results    HPI:The patient is a 46 y.o. female.  She returns to sleep clinic to discuss the results of the recent sleep study.  She had a diagnostic polysomnogram on the night of 07/28/2017.  AHI of 16., REM AHI of 35.1.  INTERVAL MEDICAL HISTORY: no change    MEDICATIONS:   Current Outpatient Prescriptions:   •  acyclovir (ZOVIRAX) 400 MG tablet, Take 400 mg by mouth 2 (Two) Times a Day. Take no more than 5 doses a day., Disp: , Rfl:   •  ARIPiprazole (ABILIFY) 2 MG tablet, Take 2 mg by mouth Daily., Disp: , Rfl:   •  desvenlafaxine (PRISTIQ) 100 MG 24 hr tablet, Take 1 tablet by mouth Daily., Disp: 30 tablet, Rfl: 5  •  fluticasone (FLONASE) 50 MCG/ACT nasal spray, 1 spray into each nostril daily. Administer 2 sprays in each nostril for each dose., Disp: , Rfl:   •  guaiFENesin (MUCINEX) 600 MG 12 hr tablet, Take 600 mg by mouth Daily As Needed., Disp: , Rfl:   •  levothyroxine (SYNTHROID, LEVOTHROID) 75 MCG tablet, Take 1 tablet by mouth Daily., Disp: 90 tablet, Rfl: 0  •  loratadine (CLARITIN) 10 MG tablet, Take 1 tablet by mouth Daily As Needed., Disp: , Rfl:   •  nitrofurantoin, macrocrystal-monohydrate, (MACROBID) 100 MG capsule, Take 100 mg by mouth Daily., Disp: , Rfl:   •  omeprazole (PriLOSEC) 20 MG capsule, Take 1 capsule by mouth daily., Disp: 90 capsule, Rfl: 3  •  traZODone (DESYREL) 50 MG tablet, Take 1 tablet by mouth Every Night., Disp: 30 tablet, Rfl: 5    PHYSICAL EXAM  Vital Signs (last 24 hours)       07/31 0700  -  08/01 0659 08/01 0700  -  08/01 1434   Most Recent    BP     124/74     124/74          Gen:  No distress, appears stated age, alert, oriented to person, place, and time  Heent:   NC/AT, PERRLA, EOMI, anicteric sclera    External ears/nose normal, OP clear, Mallamati 4  NECK:  Supple, midline trachea, no palpable goiter  LUNGS:   EXT:  No cyanosis or clubbing      IMPRESSION AND PLAN:  1.mild Obstructive Sleep Apnea Hypopnea Syndrome with AHI  of 16.1..  The sleep results were discussed in detail with the patient.  The risks of untreated sleep apnea were reviewed.  Treatment options for sleep apnea were discussed. I counseled patient on sleep hygiene, including regular sleep wake schedule and stimulus control therapy, the importance of weight reduction, and abstaining from smoking and alcohol consumption.    Other treatment options including UPPP surgery, LAUP surgery, radiofrequency somnoplasty, nasal 1-way valves, implantable stimulators, and dental appliances. The patient decided to pursue CPAP therapy.  She will follow-up in 2-3 months with a download.  She agrees to return sooner on a prn basis.      All of the patient's questions were answered. She states understanding and agreement with my assessment and plan as above.  Total time 20 min, more than half spent in face to face counseling and coordination of care.           This document has been electronically signed by Boubacar Viveros MD on August 1, 2017   Boubacar Viveros MD        CC: Guillermo Caal MD          No ref. provider found

## 2017-08-02 PROBLEM — Z99.89 OBSTRUCTIVE SLEEP APNEA ON CPAP: Chronic | Status: ACTIVE | Noted: 2017-08-02

## 2017-08-02 PROBLEM — G47.33 OBSTRUCTIVE SLEEP APNEA ON CPAP: Chronic | Status: ACTIVE | Noted: 2017-08-02

## 2017-08-07 ENCOUNTER — TELEPHONE (OUTPATIENT)
Dept: FAMILY MEDICINE CLINIC | Facility: CLINIC | Age: 47
End: 2017-08-07

## 2017-08-07 DIAGNOSIS — N39.0 CHRONIC URINARY TRACT INFECTION: Primary | Chronic | ICD-10-CM

## 2017-08-07 RX ORDER — NITROFURANTOIN 25; 75 MG/1; MG/1
100 CAPSULE ORAL DAILY
Qty: 30 CAPSULE | Refills: 5 | Status: SHIPPED | OUTPATIENT
Start: 2017-08-07 | End: 2020-07-06 | Stop reason: SDUPTHER

## 2017-08-07 NOTE — TELEPHONE ENCOUNTER
"Patient left a voice message \"requesting refills on the macrobid, suppression antibiotic for my UTI's to [facility]\".   "

## 2017-08-31 ENCOUNTER — OFFICE VISIT (OUTPATIENT)
Dept: FAMILY MEDICINE CLINIC | Facility: CLINIC | Age: 47
End: 2017-08-31

## 2017-08-31 VITALS
DIASTOLIC BLOOD PRESSURE: 70 MMHG | HEART RATE: 86 BPM | HEIGHT: 64 IN | WEIGHT: 258 LBS | SYSTOLIC BLOOD PRESSURE: 114 MMHG | BODY MASS INDEX: 44.05 KG/M2

## 2017-08-31 DIAGNOSIS — N39.0 CHRONIC URINARY TRACT INFECTION: Chronic | ICD-10-CM

## 2017-08-31 DIAGNOSIS — F41.1 GENERALIZED ANXIETY DISORDER: Chronic | ICD-10-CM

## 2017-08-31 DIAGNOSIS — F33.1 MODERATE EPISODE OF RECURRENT MAJOR DEPRESSIVE DISORDER (HCC): Primary | Chronic | ICD-10-CM

## 2017-08-31 PROCEDURE — 99214 OFFICE O/P EST MOD 30 MIN: CPT | Performed by: INTERNAL MEDICINE

## 2017-08-31 RX ORDER — ARIPIPRAZOLE 5 MG/1
5 TABLET ORAL DAILY
COMMUNITY
End: 2017-08-31 | Stop reason: SDUPTHER

## 2017-08-31 RX ORDER — ARIPIPRAZOLE 10 MG/1
10 TABLET ORAL DAILY
Qty: 30 TABLET | Refills: 5 | Status: SHIPPED | OUTPATIENT
Start: 2017-08-31 | End: 2017-11-09 | Stop reason: SDUPTHER

## 2017-08-31 RX ORDER — BUPROPION HYDROCHLORIDE 150 MG/1
150 TABLET ORAL DAILY
Qty: 30 TABLET | Refills: 5 | Status: SHIPPED | OUTPATIENT
Start: 2017-08-31 | End: 2020-03-12

## 2017-11-08 RX ORDER — LEVOTHYROXINE SODIUM 75 MCG
TABLET ORAL
Qty: 90 TABLET | Refills: 0 | Status: SHIPPED | OUTPATIENT
Start: 2017-11-08 | End: 2020-03-13 | Stop reason: DRUGHIGH

## 2017-11-09 DIAGNOSIS — F33.1 MODERATE EPISODE OF RECURRENT MAJOR DEPRESSIVE DISORDER (HCC): Chronic | ICD-10-CM

## 2017-11-09 DIAGNOSIS — F41.1 GENERALIZED ANXIETY DISORDER: Chronic | ICD-10-CM

## 2017-11-09 RX ORDER — ARIPIPRAZOLE 10 MG/1
10 TABLET ORAL DAILY
Qty: 90 TABLET | Refills: 1 | Status: SHIPPED | OUTPATIENT
Start: 2017-11-09 | End: 2020-03-12

## 2017-11-09 RX ORDER — TRAZODONE HYDROCHLORIDE 50 MG/1
50 TABLET ORAL NIGHTLY
Qty: 90 TABLET | Refills: 1 | Status: SHIPPED | OUTPATIENT
Start: 2017-11-09 | End: 2020-03-12

## 2020-02-25 ENCOUNTER — TELEPHONE (OUTPATIENT)
Dept: FAMILY MEDICINE CLINIC | Facility: CLINIC | Age: 50
End: 2020-02-25

## 2020-02-25 DIAGNOSIS — E03.9 ACQUIRED HYPOTHYROIDISM: Chronic | ICD-10-CM

## 2020-02-25 DIAGNOSIS — R73.02 IMPAIRED GLUCOSE TOLERANCE: Chronic | ICD-10-CM

## 2020-02-25 DIAGNOSIS — E78.2 MIXED HYPERLIPIDEMIA: Primary | Chronic | ICD-10-CM

## 2020-02-25 NOTE — TELEPHONE ENCOUNTER
----- Message from Xiao Singer sent at 2/21/2020 11:37 AM CST -----  Regarding: LAB ORDERS   Needs lab orders put in, appt 03/05/2020

## 2020-03-12 ENCOUNTER — LAB (OUTPATIENT)
Dept: LAB | Facility: OTHER | Age: 50
End: 2020-03-12

## 2020-03-12 ENCOUNTER — OFFICE VISIT (OUTPATIENT)
Dept: FAMILY MEDICINE CLINIC | Facility: CLINIC | Age: 50
End: 2020-03-12

## 2020-03-12 VITALS
HEART RATE: 88 BPM | BODY MASS INDEX: 46.1 KG/M2 | HEIGHT: 64 IN | DIASTOLIC BLOOD PRESSURE: 84 MMHG | WEIGHT: 270 LBS | TEMPERATURE: 97.9 F | SYSTOLIC BLOOD PRESSURE: 126 MMHG | OXYGEN SATURATION: 98 %

## 2020-03-12 DIAGNOSIS — R73.02 IMPAIRED GLUCOSE TOLERANCE: Primary | Chronic | ICD-10-CM

## 2020-03-12 DIAGNOSIS — E78.2 MIXED HYPERLIPIDEMIA: ICD-10-CM

## 2020-03-12 DIAGNOSIS — F31.76 BIPOLAR DISORDER, IN FULL REMISSION, MOST RECENT EPISODE DEPRESSED (HCC): Chronic | ICD-10-CM

## 2020-03-12 DIAGNOSIS — E78.2 MIXED HYPERLIPIDEMIA: Chronic | ICD-10-CM

## 2020-03-12 DIAGNOSIS — K21.9 GASTROESOPHAGEAL REFLUX DISEASE WITHOUT ESOPHAGITIS: Chronic | ICD-10-CM

## 2020-03-12 DIAGNOSIS — F41.1 GENERALIZED ANXIETY DISORDER: Chronic | ICD-10-CM

## 2020-03-12 DIAGNOSIS — G47.10 HYPERSOMNIA: Chronic | ICD-10-CM

## 2020-03-12 DIAGNOSIS — R73.02 IMPAIRED GLUCOSE TOLERANCE: ICD-10-CM

## 2020-03-12 DIAGNOSIS — E03.9 ACQUIRED HYPOTHYROIDISM: ICD-10-CM

## 2020-03-12 DIAGNOSIS — E03.9 ACQUIRED HYPOTHYROIDISM: Chronic | ICD-10-CM

## 2020-03-12 DIAGNOSIS — E55.9 VITAMIN D DEFICIENCY: Chronic | ICD-10-CM

## 2020-03-12 LAB
ALBUMIN SERPL-MCNC: 3.9 G/DL (ref 3.5–5)
ALBUMIN/GLOB SERPL: 1.3 G/DL (ref 1.1–1.8)
ALP SERPL-CCNC: 75 U/L (ref 38–126)
ALT SERPL W P-5'-P-CCNC: 34 U/L
ANION GAP SERPL CALCULATED.3IONS-SCNC: 9 MMOL/L (ref 5–15)
AST SERPL-CCNC: 29 U/L (ref 14–36)
BASOPHILS # BLD AUTO: 0.06 10*3/MM3 (ref 0–0.2)
BASOPHILS NFR BLD AUTO: 0.6 % (ref 0–1.5)
BILIRUB SERPL-MCNC: 0.4 MG/DL (ref 0.2–1.3)
BILIRUB UR QL STRIP: NEGATIVE
BUN BLD-MCNC: 7 MG/DL (ref 7–23)
BUN/CREAT SERPL: 8.2 (ref 7–25)
CALCIUM SPEC-SCNC: 8.8 MG/DL (ref 8.4–10.2)
CHLORIDE SERPL-SCNC: 104 MMOL/L (ref 101–112)
CHOLEST SERPL-MCNC: 180 MG/DL (ref 150–200)
CLARITY UR: CLEAR
CO2 SERPL-SCNC: 26 MMOL/L (ref 22–30)
COLOR UR: YELLOW
CREAT BLD-MCNC: 0.85 MG/DL (ref 0.52–1.04)
DEPRECATED RDW RBC AUTO: 43.8 FL (ref 37–54)
EOSINOPHIL # BLD AUTO: 0.22 10*3/MM3 (ref 0–0.4)
EOSINOPHIL NFR BLD AUTO: 2.3 % (ref 0.3–6.2)
ERYTHROCYTE [DISTWIDTH] IN BLOOD BY AUTOMATED COUNT: 12.5 % (ref 12.3–15.4)
GFR SERPL CREATININE-BSD FRML MDRD: 71 ML/MIN/1.73 (ref 58–135)
GLOBULIN UR ELPH-MCNC: 3 GM/DL (ref 2.3–3.5)
GLUCOSE BLD-MCNC: 118 MG/DL (ref 70–99)
GLUCOSE UR STRIP-MCNC: NEGATIVE MG/DL
HCT VFR BLD AUTO: 40.1 % (ref 34–46.6)
HDLC SERPL-MCNC: 56 MG/DL (ref 40–59)
HGB BLD-MCNC: 13.3 G/DL (ref 12–15.9)
HGB UR QL STRIP.AUTO: NEGATIVE
KETONES UR QL STRIP: NEGATIVE
LDLC SERPL CALC-MCNC: 98 MG/DL
LDLC/HDLC SERPL: 1.74 {RATIO} (ref 0–3.22)
LEUKOCYTE ESTERASE UR QL STRIP.AUTO: NEGATIVE
LYMPHOCYTES # BLD AUTO: 2.87 10*3/MM3 (ref 0.7–3.1)
LYMPHOCYTES NFR BLD AUTO: 30.2 % (ref 19.6–45.3)
MCH RBC QN AUTO: 32.4 PG (ref 26.6–33)
MCHC RBC AUTO-ENTMCNC: 33.2 G/DL (ref 31.5–35.7)
MCV RBC AUTO: 97.8 FL (ref 79–97)
MONOCYTES # BLD AUTO: 0.69 10*3/MM3 (ref 0.1–0.9)
MONOCYTES NFR BLD AUTO: 7.3 % (ref 5–12)
NEUTROPHILS # BLD AUTO: 5.65 10*3/MM3 (ref 1.7–7)
NEUTROPHILS NFR BLD AUTO: 59.6 % (ref 42.7–76)
NITRITE UR QL STRIP: NEGATIVE
PH UR STRIP.AUTO: 5.5 [PH] (ref 5.5–8)
PLATELET # BLD AUTO: 315 10*3/MM3 (ref 140–450)
PMV BLD AUTO: 9.5 FL (ref 6–12)
POTASSIUM BLD-SCNC: 4.1 MMOL/L (ref 3.4–5)
PROT SERPL-MCNC: 6.9 G/DL (ref 6.3–8.6)
PROT UR QL STRIP: NEGATIVE
RBC # BLD AUTO: 4.1 10*6/MM3 (ref 3.77–5.28)
SODIUM BLD-SCNC: 139 MMOL/L (ref 137–145)
SP GR UR STRIP: 1.02 (ref 1–1.03)
TRIGL SERPL-MCNC: 132 MG/DL
UROBILINOGEN UR QL STRIP: NORMAL
VLDLC SERPL-MCNC: 26.4 MG/DL
WBC NRBC COR # BLD: 9.49 10*3/MM3 (ref 3.4–10.8)

## 2020-03-12 PROCEDURE — 80053 COMPREHEN METABOLIC PANEL: CPT | Performed by: INTERNAL MEDICINE

## 2020-03-12 PROCEDURE — 84443 ASSAY THYROID STIM HORMONE: CPT | Performed by: INTERNAL MEDICINE

## 2020-03-12 PROCEDURE — 36415 COLL VENOUS BLD VENIPUNCTURE: CPT | Performed by: INTERNAL MEDICINE

## 2020-03-12 PROCEDURE — 99214 OFFICE O/P EST MOD 30 MIN: CPT | Performed by: INTERNAL MEDICINE

## 2020-03-12 PROCEDURE — 81003 URINALYSIS AUTO W/O SCOPE: CPT | Performed by: INTERNAL MEDICINE

## 2020-03-12 PROCEDURE — 84439 ASSAY OF FREE THYROXINE: CPT | Performed by: INTERNAL MEDICINE

## 2020-03-12 PROCEDURE — 80061 LIPID PANEL: CPT | Performed by: INTERNAL MEDICINE

## 2020-03-12 PROCEDURE — 85025 COMPLETE CBC W/AUTO DIFF WBC: CPT | Performed by: INTERNAL MEDICINE

## 2020-03-12 RX ORDER — LURASIDONE HYDROCHLORIDE 60 MG/1
60 TABLET, FILM COATED ORAL DAILY
Qty: 30 TABLET | Refills: 1 | Status: SHIPPED | OUTPATIENT
Start: 2020-03-12 | End: 2020-07-06 | Stop reason: SDUPTHER

## 2020-03-12 RX ORDER — LURASIDONE HYDROCHLORIDE 60 MG/1
TABLET, FILM COATED ORAL
COMMUNITY
Start: 2018-03-01 | End: 2020-03-12 | Stop reason: SDUPTHER

## 2020-03-12 RX ORDER — NITROFURANTOIN MACROCRYSTALS 100 MG/1
100 CAPSULE ORAL
COMMUNITY
Start: 2019-04-24 | End: 2020-07-06

## 2020-03-12 RX ORDER — LAMOTRIGINE 100 MG/1
100 TABLET ORAL DAILY
Qty: 30 TABLET | Refills: 1 | Status: SHIPPED | OUTPATIENT
Start: 2020-03-12 | End: 2020-07-06 | Stop reason: SDUPTHER

## 2020-03-12 RX ORDER — FLUOXETINE HYDROCHLORIDE 20 MG/1
20 CAPSULE ORAL DAILY
Qty: 30 CAPSULE | Refills: 1 | Status: SHIPPED | OUTPATIENT
Start: 2020-03-12 | End: 2020-07-03

## 2020-03-12 RX ORDER — FLUOXETINE HYDROCHLORIDE 20 MG/1
30 CAPSULE ORAL
COMMUNITY
Start: 2019-04-16 | End: 2020-03-12 | Stop reason: SDUPTHER

## 2020-03-12 RX ORDER — FLUOXETINE 10 MG/1
10 CAPSULE ORAL DAILY
Qty: 30 CAPSULE | Refills: 1 | Status: SHIPPED | OUTPATIENT
Start: 2020-03-12 | End: 2020-06-19

## 2020-03-12 RX ORDER — LAMOTRIGINE 100 MG/1
TABLET ORAL
COMMUNITY
Start: 2017-10-01 | End: 2020-03-12 | Stop reason: SDUPTHER

## 2020-03-12 RX ORDER — FLUOXETINE 10 MG/1
10 CAPSULE ORAL
COMMUNITY
Start: 2019-06-16 | End: 2020-03-12 | Stop reason: SDUPTHER

## 2020-03-12 NOTE — PROGRESS NOTES
Chief Complaint   Patient presents with   • Hyperlipidemia     Lab FU     Subjective   My Hart is a 49 y.o. female who presents to the office for follow-up and review of labs.  Her labs were just drawn this morning, so some results are still pending.  It has been a couple of years since I last saw her.  She has been living in [place], and has just recently moved back to this area.  She has impaired glucose tolerance, and monitors her dietary intake of sugar and carbohydrates. She has hyperlipidemia and manages this with diet.  She has GERD and takes omeprazole daily.  She has hypothyroidism, and takes Synthroid daily.     She has been seeing psychiatry, and has a diagnosis of bipolar disorder and hypersomnia.  She takes fluoxetine 30 mg daily, Latuda 60 mg daily, Lamictal 100 mg daily, and Vyvanse 50 mg each morning.  She has been well managed with this combination of medications.  She is needing a referral to see psychiatry in this area now that she has moved.  She works in [place], so she would like to see someone there.    The following portions of the patient's history were reviewed and updated as appropriate: allergies, current medications, past family history, past medical history, past social history, past surgical history and problem list.    Review of Systems   Constitutional: Negative for chills, fatigue and fever.   HENT: Negative for congestion, sneezing, sore throat and trouble swallowing.    Eyes: Negative for visual disturbance.   Respiratory: Negative for cough, chest tightness, shortness of breath and wheezing.    Cardiovascular: Negative for chest pain, palpitations and leg swelling.   Gastrointestinal: Negative for abdominal pain, constipation, diarrhea, nausea and vomiting.   Genitourinary: Negative for dysuria, frequency and urgency.   Musculoskeletal: Negative for neck pain.   Skin: Negative for rash.   Neurological: Negative for dizziness, weakness and headaches.  "  Psychiatric/Behavioral:        Patient denies any feelings of depression and has not felt down, hopeless or lost interest in any activities.   All other systems reviewed and are negative.      Objective   Vitals:    03/12/20 1009   BP: 126/84   BP Location: Left arm   Patient Position: Sitting   Cuff Size: Adult   Pulse: 88   Temp: 97.9 °F (36.6 °C)   TempSrc: Oral   SpO2: 98%   Weight: 122 kg (270 lb)   Height: 162.6 cm (64\")   PainSc:   3   PainLoc: Shoulder  Comment: left     Body mass index is 46.35 kg/m².  Physical Exam   Constitutional: She is oriented to person, place, and time. She appears well-developed and well-nourished.   HENT:   Head: Normocephalic and atraumatic.   Nose: Nose normal.   Mouth/Throat: Oropharynx is clear and moist. No oropharyngeal exudate.   Eyes: Pupils are equal, round, and reactive to light. Conjunctivae and EOM are normal. Right eye exhibits no discharge. Left eye exhibits no discharge. No scleral icterus.   Neck: Normal range of motion. Neck supple. No thyromegaly present.   Cardiovascular: Normal rate, regular rhythm, normal heart sounds and intact distal pulses. Exam reveals no gallop and no friction rub.   No murmur heard.  Pulmonary/Chest: Effort normal and breath sounds normal. No respiratory distress. She has no wheezes. She has no rales.   Abdominal: Soft. Bowel sounds are normal. She exhibits no distension. There is no tenderness. There is no rebound and no guarding.   Musculoskeletal: She exhibits no edema.   Lymphadenopathy:     She has no cervical adenopathy.   Neurological: She is alert and oriented to person, place, and time. No cranial nerve deficit.   Skin: Skin is warm and dry. No rash noted.   Psychiatric: She has a normal mood and affect. Her behavior is normal. Judgment and thought content normal.   Nursing note and vitals reviewed.      Assessment/Plan   My was seen today for hyperlipidemia.    Diagnoses and all orders for this visit:    Impaired glucose " tolerance  -     Hemoglobin A1c; Future    Mixed hyperlipidemia  -     CBC & Differential; Future  -     Comprehensive Metabolic Panel; Future  -     Lipid Panel; Future    Gastroesophageal reflux disease without esophagitis    Acquired hypothyroidism  -     T4, Free; Future  -     TSH; Future    Generalized anxiety disorder    Bipolar disorder, in full remission, most recent episode depressed (CMS/[facility])  -     FLUoxetine (PROzac) 20 MG capsule; Take 1 capsule by mouth Daily. Take daily with 10mg dose for a total of 30 mg.  -     FLUoxetine (PROzac) 10 MG capsule; Take 1 capsule by mouth Daily. Take daily with 20mg for a total dose of 30 mg.  -     lurasidone HCl (LATUDA) 60 MG tablet tablet; Take 1 tablet by mouth Daily.  -     lamoTRIgine (LaMICtal) 100 MG tablet; Take 1 tablet by mouth Daily.  -     Ambulatory Referral to Psychiatry    Hypersomnia  -     lisdexamfetamine (VYVANSE) 50 MG capsule; Take 1 capsule by mouth Every Morning  -     Ambulatory Referral to Psychiatry    Vitamin D deficiency  -     Vitamin D 25 Hydroxy; Future         Labs are reviewed with patient.  Patient's glucose is slightly elevated at 118.  Patient will continue to watch diet to help maintain control of the glucose.  Patient understands that there is an increased risk of developing diabetes in the future.  Her TSH is still pending.  She will continue with the current dose of Synthroid for now.  Her lipids are normal.  Her total cholesterol is 180, with an LDL of 98 and triglycerides of 132.  She will continue with dietary management of the hyperlipidemia. She will continue with omeprazole for treatment of her GERD.    She will continue with fluoxetine 30 mg daily, Latuda 60 mg daily, Lamictal 100 mg daily, and Vyvanse 50 mg daily.  I will refer her to psychiatry for management of the bipolar disorder and hypersomnia.    Patient understands the risks associated with this controlled medication, including tolerance and addiction.  Patient  also agrees to only obtain this medication from me (until she establishes care with psychiatry), and not from a another provider, unless that provider is covering for me in my absence.  Patient also agrees to be compliant in dosing, and not self adjust the dose of medication.  A signed controlled substance agreement is on file, and the patient has received a controlled substance education sheet at this or a previous visit.  The patient has also signed a consent for treatment with a controlled substance as per [facility] policy. KYLEIGH is obtained.    Patient's Body mass index is 46.35 kg/m². BMI is above normal parameters. Recommendations include: educational material     CONTROLLED SUBSTANCE TRACKING 3/12/2020   Last Kyleigh 3/12/2020   Report Number [id number]   Last Controlled Substance Agreement 3/12/2020         PHQ-2/PHQ-9 Depression Screening 3/12/2020   Little interest or pleasure in doing things 3   Feeling down, depressed, or hopeless 1   Trouble falling or staying asleep, or sleeping too much 3   Feeling tired or having little energy 3   Poor appetite or overeating 1   Feeling bad about yourself - or that you are a failure or have let yourself or your family down 1   Trouble concentrating on things, such as reading the newspaper or watching television 1   Moving or speaking so slowly that other people could have noticed. Or the opposite - being so fidgety or restless that you have been moving around a lot more than usual 0   Thoughts that you would be better off dead, or of hurting yourself in some way 0   Total Score 13   If you checked off any problems, how difficult have these problems made it for you to do your work, take care of things at home, or get along with other people? Extremely dIfficult         Lab on 03/12/2020   Component Date Value Ref Range Status   • Glucose 03/12/2020 118* 70 - 99 mg/dL Final   • BUN 03/12/2020 7  7 - 23 mg/dL Final   • Creatinine 03/12/2020 0.85  0.52 - 1.04 mg/dL  Final   • Sodium 03/12/2020 139  137 - 145 mmol/L Final   • Potassium 03/12/2020 4.1  3.4 - 5.0 mmol/L Final   • Chloride 03/12/2020 104  101 - 112 mmol/L Final   • CO2 03/12/2020 26.0  22.0 - 30.0 mmol/L Final   • Calcium 03/12/2020 8.8  8.4 - 10.2 mg/dL Final   • Total Protein 03/12/2020 6.9  6.3 - 8.6 g/dL Final   • Albumin 03/12/2020 3.90  3.50 - 5.00 g/dL Final   • ALT (SGPT) 03/12/2020 34  <=35 U/L Final   • AST (SGOT) 03/12/2020 29  14 - 36 U/L Final   • Alkaline Phosphatase 03/12/2020 75  38 - 126 U/L Final   • Total Bilirubin 03/12/2020 0.4  0.2 - 1.3 mg/dL Final   • eGFR Non African Amer 03/12/2020 71  58 - 135 mL/min/1.73 Final   • Globulin 03/12/2020 3.0  2.3 - 3.5 gm/dL Final   • A/G Ratio 03/12/2020 1.3  1.1 - 1.8 g/dL Final   • BUN/Creatinine Ratio 03/12/2020 8.2  7.0 - 25.0 Final   • Anion Gap 03/12/2020 9.0  5.0 - 15.0 mmol/L Final   • Total Cholesterol 03/12/2020 180  150 - 200 mg/dL Final   • Triglycerides 03/12/2020 132  <=150 mg/dL Final   • HDL Cholesterol 03/12/2020 56  40 - 59 mg/dL Final   • LDL Cholesterol  03/12/2020 98  <=100 mg/dL Final   • VLDL Cholesterol 03/12/2020 26.4  mg/dL Final   • LDL/HDL Ratio 03/12/2020 1.74  0.00 - 3.22 Final   • Color, UA 03/12/2020 Yellow  Yellow, Straw Final   • Appearance, UA 03/12/2020 Clear  Clear Final   • pH, UA 03/12/2020 5.5  5.5 - 8.0 Final   • Specific Gravity, UA 03/12/2020 1.020  1.005 - 1.030 Final   • Glucose, UA 03/12/2020 Negative  Negative Final   • Ketones, UA 03/12/2020 Negative  Negative Final   • Bilirubin, UA 03/12/2020 Negative  Negative Final   • Blood, UA 03/12/2020 Negative  Negative Final   • Protein, UA 03/12/2020 Negative  Negative Final   • Leuk Esterase, UA 03/12/2020 Negative  Negative Final   • Nitrite, UA 03/12/2020 Negative  Negative Final   • Urobilinogen, UA 03/12/2020 0.2 E.U./dL  0.2 - 1.0 E.U./dL Final   • WBC 03/12/2020 9.49  3.40 - 10.80 10*3/mm3 Final   • RBC 03/12/2020 4.10  3.77 - 5.28 10*6/mm3 Final   •  Hemoglobin 03/12/2020 13.3  12.0 - 15.9 g/dL Final   • Hematocrit 03/12/2020 40.1  34.0 - 46.6 % Final   • MCV 03/12/2020 97.8* 79.0 - 97.0 fL Final   • MCH 03/12/2020 32.4  26.6 - 33.0 pg Final   • MCHC 03/12/2020 33.2  31.5 - 35.7 g/dL Final   • RDW 03/12/2020 12.5  12.3 - 15.4 % Final   • RDW-SD 03/12/2020 43.8  37.0 - 54.0 fl Final   • MPV 03/12/2020 9.5  6.0 - 12.0 fL Final   • Platelets 03/12/2020 315  140 - 450 10*3/mm3 Final   • Neutrophil % 03/12/2020 59.6  42.7 - 76.0 % Final   • Lymphocyte % 03/12/2020 30.2  19.6 - 45.3 % Final   • Monocyte % 03/12/2020 7.3  5.0 - 12.0 % Final   • Eosinophil % 03/12/2020 2.3  0.3 - 6.2 % Final   • Basophil % 03/12/2020 0.6  0.0 - 1.5 % Final   • Neutrophils, Absolute 03/12/2020 5.65  1.70 - 7.00 10*3/mm3 Final   • Lymphocytes, Absolute 03/12/2020 2.87  0.70 - 3.10 10*3/mm3 Final   • Monocytes, Absolute 03/12/2020 0.69  0.10 - 0.90 10*3/mm3 Final   • Eosinophils, Absolute 03/12/2020 0.22  0.00 - 0.40 10*3/mm3 Final   • Basophils, Absolute 03/12/2020 0.06  0.00 - 0.20 10*3/mm3 Final   ]

## 2020-03-12 NOTE — PATIENT INSTRUCTIONS

## 2020-03-13 DIAGNOSIS — E03.9 ACQUIRED HYPOTHYROIDISM: Primary | Chronic | ICD-10-CM

## 2020-03-13 LAB
T4 FREE SERPL-MCNC: 1.56 NG/DL (ref 0.93–1.7)
TSH SERPL DL<=0.05 MIU/L-ACNC: 4.56 UIU/ML (ref 0.27–4.2)

## 2020-03-13 RX ORDER — LEVOTHYROXINE SODIUM 88 UG/1
88 TABLET ORAL DAILY
Qty: 30 TABLET | Refills: 5 | Status: SHIPPED | OUTPATIENT
Start: 2020-03-13 | End: 2020-09-09

## 2020-05-26 ENCOUNTER — TELEPHONE (OUTPATIENT)
Dept: FAMILY MEDICINE CLINIC | Facility: CLINIC | Age: 50
End: 2020-05-26

## 2020-05-26 DIAGNOSIS — G47.10 HYPERSOMNIA: Primary | ICD-10-CM

## 2020-05-26 DIAGNOSIS — F33.42 RECURRENT MAJOR DEPRESSIVE DISORDER, IN FULL REMISSION (HCC): Chronic | ICD-10-CM

## 2020-05-26 DIAGNOSIS — F41.1 GENERALIZED ANXIETY DISORDER: Chronic | ICD-10-CM

## 2020-05-26 DIAGNOSIS — F31.76 BIPOLAR DISORDER, IN FULL REMISSION, MOST RECENT EPISODE DEPRESSED (HCC): Chronic | ICD-10-CM

## 2020-05-26 NOTE — TELEPHONE ENCOUNTER
Patient called and states she was referred to [facility] counseling. They only have counselors. She is needing a Psychiatrist. She is now requesting [facility]. States she has been there in the past.

## 2020-05-29 DIAGNOSIS — G47.10 HYPERSOMNIA: Chronic | ICD-10-CM

## 2020-05-29 NOTE — TELEPHONE ENCOUNTER
----- Message from Xiao Singer sent at 5/26/2020 10:00 AM CDT -----  Regarding: MED REFILL  Contact: [phone]   Needs refill on lisdexamfetamine (VYVANSE) 50 MG, due Friday to [facility].

## 2020-05-29 NOTE — TELEPHONE ENCOUNTER
CONTROLLED SUBSTANCE TRACKING 3/12/2020   Last Jesús 3/12/2020   Report Number [id number]   Last Controlled Substance Agreement 3/12/2020     Next office visit is scheduled for 9-4-20. Last refill of Vyvanse was on 3-12-20.

## 2020-06-01 DIAGNOSIS — G47.10 HYPERSOMNIA: Chronic | ICD-10-CM

## 2020-06-01 NOTE — TELEPHONE ENCOUNTER
Patient wanted [facility] in [place]. Was not specified and only the [facility] in [place] was [facility].

## 2020-06-19 DIAGNOSIS — F31.76 BIPOLAR DISORDER, IN FULL REMISSION, MOST RECENT EPISODE DEPRESSED (HCC): Chronic | ICD-10-CM

## 2020-06-19 RX ORDER — FLUOXETINE 10 MG/1
CAPSULE ORAL
Qty: 30 CAPSULE | Refills: 5 | Status: SHIPPED | OUTPATIENT
Start: 2020-06-19 | End: 2020-08-06 | Stop reason: SDUPTHER

## 2020-07-03 DIAGNOSIS — F31.76 BIPOLAR DISORDER, IN FULL REMISSION, MOST RECENT EPISODE DEPRESSED (HCC): Chronic | ICD-10-CM

## 2020-07-03 RX ORDER — FLUOXETINE HYDROCHLORIDE 20 MG/1
CAPSULE ORAL
Qty: 30 CAPSULE | Refills: 0 | Status: SHIPPED | OUTPATIENT
Start: 2020-07-03 | End: 2020-07-06 | Stop reason: SDUPTHER

## 2020-07-06 ENCOUNTER — TELEMEDICINE (OUTPATIENT)
Dept: FAMILY MEDICINE CLINIC | Facility: CLINIC | Age: 50
End: 2020-07-06

## 2020-07-06 DIAGNOSIS — B35.1 ONYCHOMYCOSIS OF TOENAIL: ICD-10-CM

## 2020-07-06 DIAGNOSIS — F41.1 GENERALIZED ANXIETY DISORDER: Chronic | ICD-10-CM

## 2020-07-06 DIAGNOSIS — N39.0 CHRONIC URINARY TRACT INFECTION: Chronic | ICD-10-CM

## 2020-07-06 DIAGNOSIS — G47.10 HYPERSOMNIA: Chronic | ICD-10-CM

## 2020-07-06 DIAGNOSIS — F33.42 RECURRENT MAJOR DEPRESSIVE DISORDER, IN FULL REMISSION (HCC): Primary | Chronic | ICD-10-CM

## 2020-07-06 DIAGNOSIS — F31.76 BIPOLAR DISORDER, IN FULL REMISSION, MOST RECENT EPISODE DEPRESSED (HCC): Chronic | ICD-10-CM

## 2020-07-06 PROCEDURE — 99214 OFFICE O/P EST MOD 30 MIN: CPT | Performed by: INTERNAL MEDICINE

## 2020-07-06 RX ORDER — LURASIDONE HYDROCHLORIDE 60 MG/1
60 TABLET, FILM COATED ORAL DAILY
Qty: 30 TABLET | Refills: 5 | Status: SHIPPED | OUTPATIENT
Start: 2020-07-06 | End: 2021-02-23

## 2020-07-06 RX ORDER — NITROFURANTOIN 25; 75 MG/1; MG/1
100 CAPSULE ORAL DAILY
Qty: 30 CAPSULE | Refills: 5 | Status: SHIPPED | OUTPATIENT
Start: 2020-07-06 | End: 2021-02-23

## 2020-07-06 RX ORDER — TERBINAFINE HYDROCHLORIDE 250 MG/1
250 TABLET ORAL DAILY
Qty: 30 TABLET | Refills: 2 | Status: SHIPPED | OUTPATIENT
Start: 2020-07-06 | End: 2021-01-22

## 2020-07-06 RX ORDER — FLUOXETINE HYDROCHLORIDE 20 MG/1
20 CAPSULE ORAL DAILY
Qty: 30 CAPSULE | Refills: 5 | Status: SHIPPED | OUTPATIENT
Start: 2020-07-06 | End: 2020-08-06 | Stop reason: SDUPTHER

## 2020-07-06 RX ORDER — LAMOTRIGINE 100 MG/1
100 TABLET ORAL DAILY
Qty: 30 TABLET | Refills: 5 | Status: SHIPPED | OUTPATIENT
Start: 2020-07-06 | End: 2020-08-06 | Stop reason: SDUPTHER

## 2020-07-06 NOTE — PROGRESS NOTES
Telemedicine visit    You have chosen to receive care through a telehealth visit.  Do you consent to use a video/audio connection for your medical care today? Yes     Chief Complaint   Patient presents with   • Hypersomnia     3 mo f/u     Subjective   My Hart is a 49 y.o. female who is seen via telehealth video visit for follow-up. She has a diagnosis of bipolar disorder and hypersomnia.  She takes fluoxetine 30 mg daily, Latuda 60 mg daily, Lamictal 100 mg daily, and Vyvanse 50 mg each morning.  She has been well managed with this combination of medications.  Last month, I referred her to psychiatry to establish care for these conditions.  She just recently moved back into this area, and was in need of a new psychiatry provider.    She complains of thickening and yellow discoloration on her toenails bilaterally.  She has tried numerous over-the-counter medications with no improvement.  These are starting to cause some discomfort and irritation.    The following portions of the patient's history were reviewed and updated as appropriate: allergies, current medications, past family history, past medical history, past social history, past surgical history and problem list.    Review of Systems   Constitutional: Negative for chills, fatigue and fever.   HENT: Negative for congestion, sneezing, sore throat and trouble swallowing.    Eyes: Negative for visual disturbance.   Respiratory: Negative for cough, chest tightness, shortness of breath and wheezing.    Cardiovascular: Negative for chest pain, palpitations and leg swelling.   Gastrointestinal: Negative for abdominal pain, constipation, diarrhea, nausea and vomiting.   Genitourinary: Negative for dysuria, frequency and urgency.   Musculoskeletal: Negative for neck pain.   Skin: Negative for rash.   Neurological: Negative for dizziness, weakness and headaches.   Psychiatric/Behavioral:        Patient denies any feelings of depression and  has not felt down, hopeless or lost interest in any activities.   All other systems reviewed and are negative.      Objective     Physical Exam   Constitutional: She is oriented to person, place, and time. She appears well-developed and well-nourished. No distress.   HENT:   Head: Normocephalic and atraumatic.   Right Ear: Hearing and external ear normal.   Left Ear: Hearing and external ear normal.   Nose: Nose normal.   Eyes: Pupils are equal, round, and reactive to light. EOM and lids are normal. Right eye exhibits no discharge. Left eye exhibits no discharge.   Neck: Normal range of motion.   Pulmonary/Chest: Effort normal.   Neurological: She is alert and oriented to person, place, and time. No cranial nerve deficit.   Psychiatric: She has a normal mood and affect. Her behavior is normal. Judgment and thought content normal.       Assessment/Plan             My was seen today for hypersomnia.    Diagnoses and all orders for this visit:    Recurrent major depressive disorder, in full remission (CMS/HCC)    Generalized anxiety disorder    Bipolar disorder, in full remission, most recent episode depressed (CMS/HCC)  -     FLUoxetine (PROzac) 20 MG capsule; Take 1 capsule by mouth Daily. Take with the 10mg tablet  -     lurasidone HCl (LATUDA) 60 MG tablet tablet; Take 1 tablet by mouth Daily.  -     lamoTRIgine (LaMICtal) 100 MG tablet; Take 1 tablet by mouth Daily.    Hypersomnia  -     lisdexamfetamine (VYVANSE) 50 MG capsule; Take 1 capsule by mouth Every Morning    Chronic urinary tract infection  -     nitrofurantoin, macrocrystal-monohydrate, (MACROBID) 100 MG capsule; Take 1 capsule by mouth Daily.    Onychomycosis of toenail  -     terbinafine (lamiSIL) 250 MG tablet; Take 1 tablet by mouth Daily.         She will continue with fluoxetine 30 mg daily, Latuda 60 mg daily, Lamictal 100 mg daily, and Vyvanse 50 mg daily.  She has a psychiatry consult pending.  She is given Lamisil tablets, to 50 mg  daily for treatment of the onychomycosis of the toenails.    Patient understands the risks associated with this controlled medication, including tolerance and addiction.  Patient also agrees to only obtain this medication from me (until she establishes care with psychiatry), and not from a another provider, unless that provider is covering for me in my absence.  Patient also agrees to be compliant in dosing, and not self adjust the dose of medication.  A signed controlled substance agreement is on file, and the patient has received a controlled substance education sheet at this or a previous visit.  The patient has also signed a consent for treatment with a controlled substance as per [facility] policy. KYLEIGH is obtained.    CONTROLLED SUBSTANCE TRACKING 3/12/2020 7/6/2020   Last Kyleigh 3/12/2020 7/6/2020   Report Number [id number]   Last Controlled Substance Agreement 3/12/2020 -         PHQ-2/PHQ-9 Depression Screening 3/12/2020   Little interest or pleasure in doing things 3   Feeling down, depressed, or hopeless 1   Trouble falling or staying asleep, or sleeping too much 3   Feeling tired or having little energy 3   Poor appetite or overeating 1   Feeling bad about yourself - or that you are a failure or have let yourself or your family down 1   Trouble concentrating on things, such as reading the newspaper or watching television 1   Moving or speaking so slowly that other people could have noticed. Or the opposite - being so fidgety or restless that you have been moving around a lot more than usual 0   Thoughts that you would be better off dead, or of hurting yourself in some way 0   Total Score 13   If you checked off any problems, how difficult have these problems made it for you to do your work, take care of things at home, or get along with other people? Extremely dIfficult

## 2020-07-28 RX ORDER — ACYCLOVIR 400 MG/1
400 TABLET ORAL 2 TIMES DAILY
Qty: 60 TABLET | Refills: 0 | Status: SHIPPED | OUTPATIENT
Start: 2020-07-28 | End: 2020-08-06 | Stop reason: SDUPTHER

## 2020-08-03 DIAGNOSIS — G47.10 HYPERSOMNIA: Chronic | ICD-10-CM

## 2020-08-03 NOTE — TELEPHONE ENCOUNTER
CONTROLLED SUBSTANCE TRACKING 3/12/2020 7/6/2020   Last Jesús 3/12/2020 7/6/2020   Report Number [id number]   Last Controlled Substance Agreement 3/12/2020 -     Next office visit is scheduled for 9-4-20. Last refill of Vyvanse was on 7-6-2020.

## 2020-08-06 DIAGNOSIS — F31.76 BIPOLAR DISORDER, IN FULL REMISSION, MOST RECENT EPISODE DEPRESSED (HCC): Chronic | ICD-10-CM

## 2020-08-06 RX ORDER — ACYCLOVIR 400 MG/1
400 TABLET ORAL 2 TIMES DAILY
Qty: 60 TABLET | Refills: 0 | Status: SHIPPED | OUTPATIENT
Start: 2020-08-06 | End: 2020-08-10 | Stop reason: SDUPTHER

## 2020-08-06 RX ORDER — FLUOXETINE HYDROCHLORIDE 20 MG/1
20 CAPSULE ORAL DAILY
Qty: 30 CAPSULE | Refills: 5 | Status: SHIPPED | OUTPATIENT
Start: 2020-08-06 | End: 2020-08-10 | Stop reason: SDUPTHER

## 2020-08-06 RX ORDER — LAMOTRIGINE 100 MG/1
100 TABLET ORAL DAILY
Qty: 30 TABLET | Refills: 5 | Status: SHIPPED | OUTPATIENT
Start: 2020-08-06 | End: 2020-08-10 | Stop reason: SDUPTHER

## 2020-08-06 RX ORDER — FLUOXETINE 10 MG/1
10 CAPSULE ORAL DAILY
Qty: 30 CAPSULE | Refills: 5 | Status: SHIPPED | OUTPATIENT
Start: 2020-08-06 | End: 2020-08-10 | Stop reason: SDUPTHER

## 2020-08-10 DIAGNOSIS — F31.76 BIPOLAR DISORDER, IN FULL REMISSION, MOST RECENT EPISODE DEPRESSED (HCC): Chronic | ICD-10-CM

## 2020-08-10 RX ORDER — LAMOTRIGINE 100 MG/1
100 TABLET ORAL DAILY
Qty: 30 TABLET | Refills: 5 | Status: SHIPPED | OUTPATIENT
Start: 2020-08-10

## 2020-08-10 RX ORDER — FLUOXETINE 10 MG/1
10 CAPSULE ORAL DAILY
Qty: 30 CAPSULE | Refills: 5 | Status: SHIPPED | OUTPATIENT
Start: 2020-08-10 | End: 2022-12-02

## 2020-08-10 RX ORDER — FLUOXETINE HYDROCHLORIDE 20 MG/1
20 CAPSULE ORAL DAILY
Qty: 30 CAPSULE | Refills: 5 | Status: SHIPPED | OUTPATIENT
Start: 2020-08-10

## 2020-08-10 RX ORDER — ACYCLOVIR 400 MG/1
400 TABLET ORAL 2 TIMES DAILY
Qty: 60 TABLET | Refills: 0 | Status: SHIPPED | OUTPATIENT
Start: 2020-08-10 | End: 2020-10-02 | Stop reason: SDUPTHER

## 2020-08-13 ENCOUNTER — TELEPHONE (OUTPATIENT)
Dept: FAMILY MEDICINE CLINIC | Facility: CLINIC | Age: 50
End: 2020-08-13

## 2020-08-13 NOTE — TELEPHONE ENCOUNTER
There was a ref put in for the patient for behavioral health [facility] is not accepting her insurance, patient was sent to [facility] and they just called and stated that they are not accepting any new patient with medicare at this time, What would be our other options ?

## 2020-08-18 NOTE — TELEPHONE ENCOUNTER
[facility] has outpatient behavioral health services available through our clinic in [place].  They are seeing patients in this area via telemedicine video visit.  If she would like to see a provider via telemedicine, this is a good option.

## 2020-09-03 DIAGNOSIS — G47.10 HYPERSOMNIA: Chronic | ICD-10-CM

## 2020-09-03 NOTE — TELEPHONE ENCOUNTER
----- Message from Xiao Sinegr sent at 9/3/2020 11:28 AM CDT -----  Regarding: MED REFILL  Contact: [phone]  Needs refill on lisdexamfetamine (VYVANSE) 50 MG, due Saturday to [facility].

## 2020-09-09 DIAGNOSIS — E03.9 ACQUIRED HYPOTHYROIDISM: Chronic | ICD-10-CM

## 2020-09-09 RX ORDER — LEVOTHYROXINE SODIUM 88 UG/1
TABLET ORAL
Qty: 30 TABLET | Refills: 3 | Status: SHIPPED | OUTPATIENT
Start: 2020-09-09 | End: 2021-01-21

## 2020-10-02 ENCOUNTER — OFFICE VISIT (OUTPATIENT)
Dept: FAMILY MEDICINE CLINIC | Facility: CLINIC | Age: 50
End: 2020-10-02

## 2020-10-02 DIAGNOSIS — R73.02 IMPAIRED GLUCOSE TOLERANCE: Primary | Chronic | ICD-10-CM

## 2020-10-02 DIAGNOSIS — F33.42 RECURRENT MAJOR DEPRESSIVE DISORDER, IN FULL REMISSION (HCC): Chronic | ICD-10-CM

## 2020-10-02 DIAGNOSIS — G47.10 HYPERSOMNIA: Chronic | ICD-10-CM

## 2020-10-02 DIAGNOSIS — F31.76 BIPOLAR DISORDER, IN FULL REMISSION, MOST RECENT EPISODE DEPRESSED (HCC): Chronic | ICD-10-CM

## 2020-10-02 DIAGNOSIS — F41.1 GENERALIZED ANXIETY DISORDER: Chronic | ICD-10-CM

## 2020-10-02 DIAGNOSIS — E03.9 ACQUIRED HYPOTHYROIDISM: Chronic | ICD-10-CM

## 2020-10-02 DIAGNOSIS — E78.2 MIXED HYPERLIPIDEMIA: Chronic | ICD-10-CM

## 2020-10-02 PROCEDURE — 99443 PR PHYS/QHP TELEPHONE EVALUATION 21-30 MIN: CPT | Performed by: INTERNAL MEDICINE

## 2020-10-02 RX ORDER — ACYCLOVIR 400 MG/1
400 TABLET ORAL 2 TIMES DAILY
Qty: 60 TABLET | Refills: 5 | Status: SHIPPED | OUTPATIENT
Start: 2020-10-02 | End: 2022-12-02

## 2020-10-02 NOTE — PROGRESS NOTES
Telemedicine visit    You have chosen to receive care through a telephone visit. Do you consent to use a telephone visit for your medical care today? Yes      Chief Complaint   Patient presents with   • Anxiety     3 mo follow up     Subjective   My Hart is a 49 y.o. female who is seen via telephone visit for follow-up.  She is due for routine labs, but these have not been completed.  She has a diagnosis of bipolar disorder and hypersomnia.  She takes fluoxetine 30 mg daily, Latuda 60 mg daily, Lamictal 100 mg daily, and Vyvanse 50 mg each morning.  She has been well managed with this combination of medications.  I have referred her to psychiatry/behavioral health, and she has an appointment pending.    The following portions of the patient's history were reviewed and updated as appropriate: allergies, current medications, past family history, past medical history, past social history, past surgical history and problem list.    Review of Systems   Constitutional: Negative for chills, fatigue and fever.   HENT: Negative for congestion, sneezing, sore throat and trouble swallowing.    Eyes: Negative for visual disturbance.   Respiratory: Negative for cough, chest tightness, shortness of breath and wheezing.    Cardiovascular: Negative for chest pain, palpitations and leg swelling.   Gastrointestinal: Negative for abdominal pain, constipation, diarrhea, nausea and vomiting.   Genitourinary: Negative for dysuria, frequency and urgency.   Musculoskeletal: Negative for neck pain.   Skin: Negative for rash.   Neurological: Negative for dizziness, weakness and headaches.   Psychiatric/Behavioral:        Patient denies any feelings of depression and has not felt down, hopeless or lost interest in any activities.   All other systems reviewed and are negative.      Objective     Physical Exam   Constitutional: She is oriented to person, place, and time. No distress.   Neurological: She is alert  and oriented to person, place, and time.   Psychiatric: Her behavior is normal. Judgment and thought content normal.       Assessment/Plan             My was seen today for anxiety.    Diagnoses and all orders for this visit:    Impaired glucose tolerance    Mixed hyperlipidemia    Acquired hypothyroidism    Recurrent major depressive disorder, in full remission (CMS/HCC)    Generalized anxiety disorder    Bipolar disorder, in full remission, most recent episode depressed (CMS/HCC)    Hypersomnia  -     lisdexamfetamine (VYVANSE) 50 MG capsule; Take 1 capsule by mouth Every Morning    Other orders  -     acyclovir (ZOVIRAX) 400 MG tablet; Take 1 tablet by mouth 2 (Two) Times a Day. Take no more than 5 doses a day.         She will have labs updated at her convenience.  She will continue with fluoxetine 30 mg daily, Latuda 60 mg daily, Lamictal 100 mg daily, and Vyvanse 50 mg daily. She has a psychiatry consult pending.      Patient understands the risks associated with this controlled medication, including tolerance and addiction.  Patient also agrees to only obtain this medication from me (until she establishes care with psychiatry), and not from a another provider, unless that provider is covering for me in my absence.  Patient also agrees to be compliant in dosing, and not self adjust the dose of medication.  A signed controlled substance agreement is on file, and the patient has received a controlled substance education sheet at this or a previous visit.  The patient has also signed a consent for treatment with a controlled substance as per [facility] policy. KYLEIGH is obtained.    This visit has been rescheduled as a phone visit to comply with patient safety concerns in accordance with CDC recommendations. Total time of discussion was 22 minutes.      PHQ-2/PHQ-9 Depression Screening 3/12/2020   Little interest or pleasure in doing things 3   Feeling down, depressed, or hopeless 1   Trouble falling or  staying asleep, or sleeping too much 3   Feeling tired or having little energy 3   Poor appetite or overeating 1   Feeling bad about yourself - or that you are a failure or have let yourself or your family down 1   Trouble concentrating on things, such as reading the newspaper or watching television 1   Moving or speaking so slowly that other people could have noticed. Or the opposite - being so fidgety or restless that you have been moving around a lot more than usual 0   Thoughts that you would be better off dead, or of hurting yourself in some way 0   Total Score 13   If you checked off any problems, how difficult have these problems made it for you to do your work, take care of things at home, or get along with other people? Extremely dIfficult

## 2020-11-12 DIAGNOSIS — G47.10 HYPERSOMNIA: Chronic | ICD-10-CM

## 2020-11-12 NOTE — TELEPHONE ENCOUNTER
CONTROLLED SUBSTANCE TRACKING 3/12/2020 7/6/2020   Last Jesús 3/12/2020 7/6/2020   Report Number [id number]   Last Controlled Substance Agreement 3/12/2020 -

## 2020-12-17 DIAGNOSIS — G47.10 HYPERSOMNIA: Chronic | ICD-10-CM

## 2020-12-17 NOTE — TELEPHONE ENCOUNTER
CONTROLLED SUBSTANCE TRACKING 3/12/2020 7/6/2020   Last Jesús 3/12/2020 7/6/2020   Report Number [id number]   Last Controlled Substance Agreement 3/12/2020 -            PHQ-2/PHQ-9 Depression Screening 3/12/2020

## 2021-01-20 DIAGNOSIS — E03.9 ACQUIRED HYPOTHYROIDISM: Chronic | ICD-10-CM

## 2021-01-21 RX ORDER — LEVOTHYROXINE SODIUM 88 UG/1
TABLET ORAL
Qty: 30 TABLET | Refills: 3 | Status: SHIPPED | OUTPATIENT
Start: 2021-01-21 | End: 2021-06-01

## 2021-01-22 ENCOUNTER — OFFICE VISIT (OUTPATIENT)
Dept: FAMILY MEDICINE CLINIC | Facility: CLINIC | Age: 51
End: 2021-01-22

## 2021-01-22 ENCOUNTER — LAB (OUTPATIENT)
Dept: LAB | Facility: OTHER | Age: 51
End: 2021-01-22

## 2021-01-22 DIAGNOSIS — G47.33 OBSTRUCTIVE SLEEP APNEA ON CPAP: Chronic | ICD-10-CM

## 2021-01-22 DIAGNOSIS — Z99.89 OBSTRUCTIVE SLEEP APNEA ON CPAP: Chronic | ICD-10-CM

## 2021-01-22 DIAGNOSIS — G47.10 HYPERSOMNIA: Chronic | ICD-10-CM

## 2021-01-22 DIAGNOSIS — R73.02 IMPAIRED GLUCOSE TOLERANCE: Chronic | ICD-10-CM

## 2021-01-22 DIAGNOSIS — E55.9 VITAMIN D DEFICIENCY: Chronic | ICD-10-CM

## 2021-01-22 DIAGNOSIS — F31.76 BIPOLAR DISORDER, IN FULL REMISSION, MOST RECENT EPISODE DEPRESSED (HCC): Chronic | ICD-10-CM

## 2021-01-22 DIAGNOSIS — E03.9 ACQUIRED HYPOTHYROIDISM: Chronic | ICD-10-CM

## 2021-01-22 DIAGNOSIS — K21.9 GASTROESOPHAGEAL REFLUX DISEASE WITHOUT ESOPHAGITIS: Chronic | ICD-10-CM

## 2021-01-22 DIAGNOSIS — F41.1 GENERALIZED ANXIETY DISORDER: Chronic | ICD-10-CM

## 2021-01-22 DIAGNOSIS — E78.2 MIXED HYPERLIPIDEMIA: ICD-10-CM

## 2021-01-22 DIAGNOSIS — E78.2 MIXED HYPERLIPIDEMIA: Chronic | ICD-10-CM

## 2021-01-22 DIAGNOSIS — F33.42 RECURRENT MAJOR DEPRESSIVE DISORDER, IN FULL REMISSION (HCC): Primary | Chronic | ICD-10-CM

## 2021-01-22 LAB
25(OH)D3 SERPL-MCNC: 28.5 NG/ML (ref 30–100)
ALBUMIN SERPL-MCNC: 4 G/DL (ref 3.5–5)
ALBUMIN/GLOB SERPL: 1.5 G/DL (ref 1.1–1.8)
ALP SERPL-CCNC: 81 U/L (ref 38–126)
ALT SERPL W P-5'-P-CCNC: 68 U/L
ANION GAP SERPL CALCULATED.3IONS-SCNC: 7 MMOL/L (ref 5–15)
AST SERPL-CCNC: 51 U/L (ref 14–36)
BASOPHILS # BLD AUTO: 0.05 10*3/MM3 (ref 0–0.2)
BASOPHILS NFR BLD AUTO: 0.8 % (ref 0–1.5)
BILIRUB SERPL-MCNC: 0.4 MG/DL (ref 0.2–1.3)
BUN SERPL-MCNC: 10 MG/DL (ref 7–23)
BUN/CREAT SERPL: 8.6 (ref 7–25)
CALCIUM SPEC-SCNC: 8.6 MG/DL (ref 8.4–10.2)
CHLORIDE SERPL-SCNC: 102 MMOL/L (ref 101–112)
CHOLEST SERPL-MCNC: 182 MG/DL (ref 150–200)
CO2 SERPL-SCNC: 29 MMOL/L (ref 22–30)
CREAT SERPL-MCNC: 1.16 MG/DL (ref 0.52–1.04)
DEPRECATED RDW RBC AUTO: 43.7 FL (ref 37–54)
EOSINOPHIL # BLD AUTO: 0.23 10*3/MM3 (ref 0–0.4)
EOSINOPHIL NFR BLD AUTO: 3.5 % (ref 0.3–6.2)
ERYTHROCYTE [DISTWIDTH] IN BLOOD BY AUTOMATED COUNT: 12.4 % (ref 12.3–15.4)
GFR SERPL CREATININE-BSD FRML MDRD: 50 ML/MIN/1.73 (ref 58–135)
GLOBULIN UR ELPH-MCNC: 2.7 GM/DL (ref 2.3–3.5)
GLUCOSE SERPL-MCNC: 109 MG/DL (ref 70–99)
HBA1C MFR BLD: 5.72 % (ref 4.8–5.6)
HCT VFR BLD AUTO: 38.9 % (ref 34–46.6)
HDLC SERPL-MCNC: 45 MG/DL (ref 40–59)
HGB BLD-MCNC: 12.8 G/DL (ref 12–15.9)
LDLC SERPL CALC-MCNC: 110 MG/DL
LDLC/HDLC SERPL: 2.36 {RATIO} (ref 0–3.22)
LYMPHOCYTES # BLD AUTO: 2.52 10*3/MM3 (ref 0.7–3.1)
LYMPHOCYTES NFR BLD AUTO: 38.4 % (ref 19.6–45.3)
MCH RBC QN AUTO: 32.7 PG (ref 26.6–33)
MCHC RBC AUTO-ENTMCNC: 32.9 G/DL (ref 31.5–35.7)
MCV RBC AUTO: 99.2 FL (ref 79–97)
MONOCYTES # BLD AUTO: 0.59 10*3/MM3 (ref 0.1–0.9)
MONOCYTES NFR BLD AUTO: 9 % (ref 5–12)
NEUTROPHILS NFR BLD AUTO: 3.18 10*3/MM3 (ref 1.7–7)
NEUTROPHILS NFR BLD AUTO: 48.3 % (ref 42.7–76)
PLATELET # BLD AUTO: 259 10*3/MM3 (ref 140–450)
PMV BLD AUTO: 10 FL (ref 6–12)
POTASSIUM SERPL-SCNC: 3.9 MMOL/L (ref 3.4–5)
PROT SERPL-MCNC: 6.7 G/DL (ref 6.3–8.6)
RBC # BLD AUTO: 3.92 10*6/MM3 (ref 3.77–5.28)
SODIUM SERPL-SCNC: 138 MMOL/L (ref 137–145)
T4 FREE SERPL-MCNC: 1.56 NG/DL (ref 0.93–1.7)
TRIGL SERPL-MCNC: 153 MG/DL
TSH SERPL DL<=0.05 MIU/L-ACNC: 1.27 UIU/ML (ref 0.27–4.2)
VLDLC SERPL-MCNC: 27 MG/DL (ref 5–40)
WBC # BLD AUTO: 6.57 10*3/MM3 (ref 3.4–10.8)

## 2021-01-22 PROCEDURE — 84443 ASSAY THYROID STIM HORMONE: CPT | Performed by: INTERNAL MEDICINE

## 2021-01-22 PROCEDURE — 80061 LIPID PANEL: CPT | Performed by: INTERNAL MEDICINE

## 2021-01-22 PROCEDURE — 80053 COMPREHEN METABOLIC PANEL: CPT | Performed by: INTERNAL MEDICINE

## 2021-01-22 PROCEDURE — 36415 COLL VENOUS BLD VENIPUNCTURE: CPT | Performed by: INTERNAL MEDICINE

## 2021-01-22 PROCEDURE — 99443 PR PHYS/QHP TELEPHONE EVALUATION 21-30 MIN: CPT | Performed by: INTERNAL MEDICINE

## 2021-01-22 PROCEDURE — 84439 ASSAY OF FREE THYROXINE: CPT | Performed by: INTERNAL MEDICINE

## 2021-01-22 PROCEDURE — 83036 HEMOGLOBIN GLYCOSYLATED A1C: CPT | Performed by: INTERNAL MEDICINE

## 2021-01-22 PROCEDURE — 82306 VITAMIN D 25 HYDROXY: CPT | Performed by: INTERNAL MEDICINE

## 2021-01-22 PROCEDURE — 85025 COMPLETE CBC W/AUTO DIFF WBC: CPT | Performed by: INTERNAL MEDICINE

## 2021-01-22 NOTE — PROGRESS NOTES
Telemedicine visit    You have chosen to receive care through a telephone visit. Do you consent to use a telephone visit for your medical care today? Yes      Chief Complaint   Patient presents with   • Hyperlipidemia     3-month follow-up with labs   • Hypothyroidism     Subjective   My Hart is a 50 y.o. female who is seen via telephone visit for follow-up and review of labs.  She had labs drawn this morning, so some results are still pending.  She has a diagnosis of bipolar disorder and hypersomnia.  She takes fluoxetine 30 mg daily, Latuda 60 mg daily, Lamictal 100 mg daily, and Vyvanse 50 mg each morning.  She has been well managed with this combination of medications.      She has impaired glucose tolerance, and monitors her dietary intake of sugars and carbohydrates.  She has hyperlipidemia which is managed with diet.  She has hypothyroidism and takes Synthroid daily.  She takes omeprazole for treatment of GERD.    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  She had mild symptoms and did not require any treatment for this.  History of Present Illness has been reviewed and validated on 01/22/2021and updated with any changes.      The following portions of the patient's history were reviewed and updated as appropriate: allergies, current medications, past family history, past medical history, past social history, past surgical history and problem list.    Review of Systems   Constitutional: Negative for chills, fatigue and fever.   HENT: Negative for congestion, sneezing, sore throat and trouble swallowing.    Eyes: Negative for visual disturbance.   Respiratory: Negative for cough, chest tightness, shortness of breath and wheezing.    Cardiovascular: Negative for chest pain, palpitations and leg swelling.   Gastrointestinal: Negative for abdominal pain, constipation, diarrhea, nausea and vomiting.   Genitourinary: Negative for dysuria, frequency and urgency.    Musculoskeletal: Negative for neck pain.   Skin: Negative for rash.   Neurological: Negative for dizziness, weakness and headaches.   Psychiatric/Behavioral:        Patient denies any feelings of depression and has not felt down, hopeless or lost interest in any activities.   All other systems reviewed and are negative.  Review of systems has been reviewed and validated on 01/22/2021and updated with any changes.      Objective   There were no vitals taken for this visit.  Due to the possibility of current health risks associated with the patient being present in a clinical setting , and with current restrictions in place due to the COVID-19 pandemic, the patient was seen remotely.  Vital signs were unable to be obtained due to the nature of the remote visit.  Physical Exam   Constitutional: She is oriented to person, place, and time. No distress.   Neurological: She is alert and oriented to person, place, and time.   Psychiatric: Her behavior is normal. Judgment and thought content normal.   Physical exam has been reviewed and validated on 01/22/2021and updated with any changes.      Assessment/Plan             Diagnoses and all orders for this visit:    1. Recurrent major depressive disorder, in full remission (CMS/HCC) (Primary)    2. Generalized anxiety disorder    3. Bipolar disorder, in full remission, most recent episode depressed (CMS/HCC)    4. Hypersomnia  -     lisdexamfetamine (VYVANSE) 50 MG capsule; Take 1 capsule by mouth Every Morning  Dispense: 30 capsule; Refill: 0    5. Obstructive sleep apnea on CPAP    6. Impaired glucose tolerance    7. Mixed hyperlipidemia    8. Acquired hypothyroidism    9. Gastroesophageal reflux disease without esophagitis         Labs are reviewed with patient.  Patient's glucose is slightly elevated at 109.  Patient will continue to watch diet to help maintain control of the glucose.  Patient understands that there is an increased risk of developing diabetes in the  future.  Her creatinine is mildly elevated at 1.16.  She will increase her fluid intake to help ensure adequate hydration.  Her ALT and AST are also slightly elevated at 68 and 51.  I will continue to monitor this for now.  Her total cholesterol is 182,  and triglycerides 153.  I discussed a low-cholesterol diet to help with control of the lipids.  She will continue with the current dose of Synthroid for treatment of her hypothyroidism.    She will continue with fluoxetine 30 mg daily, Latuda 60 mg daily, Lamictal 100 mg daily, and Vyvanse 50 mg daily.     Patient understands the risks associated with this controlled medication, including tolerance and addiction.  Patient also agrees to only obtain this medication from me (until she establishes care with psychiatry), and not from a another provider, unless that provider is covering for me in my absence.  Patient also agrees to be compliant in dosing, and not self adjust the dose of medication.  A signed controlled substance agreement is on file, and the patient has received a controlled substance education sheet at this or a previous visit.  The patient has also signed a consent for treatment with a controlled substance as per [facility] policy. KYLEIGH is obtained.    This visit has been rescheduled as a phone visit to comply with patient safety concerns in accordance with CDC recommendations. Total time of discussion was 22 minutes.      PHQ-2/PHQ-9 Depression Screening 1/22/2021   Little interest or pleasure in doing things 1   Feeling down, depressed, or hopeless 0   Trouble falling or staying asleep, or sleeping too much -   Feeling tired or having little energy -   Poor appetite or overeating -   Feeling bad about yourself - or that you are a failure or have let yourself or your family down -   Trouble concentrating on things, such as reading the newspaper or watching television -   Moving or speaking so slowly that other people could have noticed.  Or the opposite - being so fidgety or restless that you have been moving around a lot more than usual -   Thoughts that you would be better off dead, or of hurting yourself in some way -   Total Score 1   If you checked off any problems, how difficult have these problems made it for you to do your work, take care of things at home, or get along with other people? -              Initiate Treatment: Ketoconazole cream Detail Level: Zone

## 2021-02-23 DIAGNOSIS — F31.76 BIPOLAR DISORDER, IN FULL REMISSION, MOST RECENT EPISODE DEPRESSED (HCC): Chronic | ICD-10-CM

## 2021-02-23 DIAGNOSIS — N39.0 CHRONIC URINARY TRACT INFECTION: Chronic | ICD-10-CM

## 2021-02-23 RX ORDER — NITROFURANTOIN 25; 75 MG/1; MG/1
CAPSULE ORAL
Qty: 30 CAPSULE | Refills: 3 | Status: SHIPPED | OUTPATIENT
Start: 2021-02-23 | End: 2022-12-02

## 2021-02-23 RX ORDER — LURASIDONE HYDROCHLORIDE 60 MG/1
TABLET, FILM COATED ORAL
Qty: 30 TABLET | Refills: 3 | Status: SHIPPED | OUTPATIENT
Start: 2021-02-23 | End: 2022-12-02

## 2021-02-25 ENCOUNTER — OFFICE VISIT (OUTPATIENT)
Dept: FAMILY MEDICINE CLINIC | Facility: CLINIC | Age: 51
End: 2021-02-25

## 2021-02-25 ENCOUNTER — LAB (OUTPATIENT)
Dept: LAB | Facility: OTHER | Age: 51
End: 2021-02-25

## 2021-02-25 VITALS
HEIGHT: 64 IN | OXYGEN SATURATION: 99 % | HEART RATE: 72 BPM | WEIGHT: 271 LBS | DIASTOLIC BLOOD PRESSURE: 76 MMHG | TEMPERATURE: 98.7 F | BODY MASS INDEX: 46.26 KG/M2 | SYSTOLIC BLOOD PRESSURE: 118 MMHG

## 2021-02-25 DIAGNOSIS — M10.072 ACUTE IDIOPATHIC GOUT OF LEFT FOOT: Primary | ICD-10-CM

## 2021-02-25 DIAGNOSIS — M79.672 LEFT FOOT PAIN: ICD-10-CM

## 2021-02-25 DIAGNOSIS — M10.072 ACUTE IDIOPATHIC GOUT OF LEFT FOOT: ICD-10-CM

## 2021-02-25 DIAGNOSIS — E66.01 MORBIDLY OBESE (HCC): ICD-10-CM

## 2021-02-25 LAB — URATE SERPL-MCNC: 5.2 MG/DL (ref 2.5–6.2)

## 2021-02-25 PROCEDURE — 36415 COLL VENOUS BLD VENIPUNCTURE: CPT | Performed by: INTERNAL MEDICINE

## 2021-02-25 PROCEDURE — 84550 ASSAY OF BLOOD/URIC ACID: CPT | Performed by: INTERNAL MEDICINE

## 2021-02-25 PROCEDURE — 99214 OFFICE O/P EST MOD 30 MIN: CPT | Performed by: INTERNAL MEDICINE

## 2021-02-25 RX ORDER — METHYLPREDNISOLONE 4 MG/1
TABLET ORAL
Qty: 1 EACH | Refills: 0 | Status: SHIPPED | OUTPATIENT
Start: 2021-02-25 | End: 2021-04-20

## 2021-02-25 NOTE — PROGRESS NOTES
"  Chief Complaint   Patient presents with   • Gout     patient thinks that she has gout in the left foot, she has slight pain and throbbing , just started sunday she is up on her feet working at least 16 hrs a day      Subjective   My Hart is a 50 y.o. female who presents to the office complaining of pain in in her left foot.  She is concerned about having a gout flareup.  She does not have a prior diagnosis of gout.  She complains of a throbbing pain.  This started 4 days ago.  She stands on her feet a lot while working, and this has been causing increased pain for the past week.    The following portions of the patient's history were reviewed and updated as appropriate: allergies, current medications, past family history, past medical history, past social history, past surgical history and problem list.    Review of Systems   Constitutional: Negative for chills, fatigue and fever.   HENT: Negative for congestion, sneezing, sore throat and trouble swallowing.    Eyes: Negative for visual disturbance.   Respiratory: Negative for cough, chest tightness, shortness of breath and wheezing.    Cardiovascular: Negative for chest pain, palpitations and leg swelling.   Gastrointestinal: Negative for abdominal pain, constipation, diarrhea, nausea and vomiting.   Genitourinary: Negative for dysuria, frequency and urgency.   Musculoskeletal: Negative for neck pain.   Skin: Negative for rash.   Neurological: Negative for dizziness, weakness and headaches.   Psychiatric/Behavioral:        Patient denies any feelings of depression and has not felt down, hopeless or lost interest in any activities.   All other systems reviewed and are negative.  Review of systems has been reviewed and validated on 02/25/2021and updated with any changes.      Objective   Vitals:    02/25/21 0947   BP: 118/76   Pulse: 72   Temp: 98.7 °F (37.1 °C)   TempSrc: Oral   SpO2: 99%   Weight: 123 kg (271 lb)   Height: 162.6 cm (64\")   PainSc:   " 3   PainLoc: Foot  Comment: left     Body mass index is 46.52 kg/m².  Physical Exam  Vitals signs and nursing note reviewed.   Constitutional:       Appearance: She is well-developed.   HENT:      Head: Normocephalic and atraumatic.      Nose: Nose normal.   Eyes:      General: No scleral icterus.        Right eye: No discharge.         Left eye: No discharge.      Conjunctiva/sclera: Conjunctivae normal.      Pupils: Pupils are equal, round, and reactive to light.   Neck:      Musculoskeletal: Normal range of motion and neck supple.      Thyroid: No thyromegaly.   Cardiovascular:      Rate and Rhythm: Normal rate and regular rhythm.      Heart sounds: Normal heart sounds. No murmur. No friction rub. No gallop.    Pulmonary:      Effort: Pulmonary effort is normal. No respiratory distress.      Breath sounds: Normal breath sounds. No wheezing or rales.   Lymphadenopathy:      Cervical: No cervical adenopathy.   Skin:     General: Skin is warm and dry.      Findings: No rash.   Neurological:      Mental Status: She is alert and oriented to person, place, and time.      Cranial Nerves: No cranial nerve deficit.   Psychiatric:         Behavior: Behavior normal.         Thought Content: Thought content normal.         Judgment: Judgment normal.         Assessment/Plan   Diagnoses and all orders for this visit:    1. Acute idiopathic gout of left foot (Primary)  -     Uric Acid; Future  -     methylPREDNISolone (MEDROL) 4 MG dose pack; Take as directed on package instructions.  Dispense: 1 each; Refill: 0    2. Left foot pain  -     Uric Acid; Future  -     methylPREDNISolone (MEDROL) 4 MG dose pack; Take as directed on package instructions.  Dispense: 1 each; Refill: 0    I will check a uric acid level today.  She will take an over-the-counter NSAID such as ibuprofen or Aleve to help with the inflammation.  She is also given a Medrol Dosepak to help with the gout flareup.  She will let me know if this does not improve  within the next few days.  I will follow-up with her once I have results of the uric acid level.  Her symptoms sound most likely to be related to an acute gout flareup.    Patient's Body mass index is 46.52 kg/m². BMI is above normal parameters. Recommendations include: educational material.         PHQ-2/PHQ-9 Depression Screening 2/25/2021   Little interest or pleasure in doing things 0   Feeling down, depressed, or hopeless 0   Trouble falling or staying asleep, or sleeping too much -   Feeling tired or having little energy -   Poor appetite or overeating -   Feeling bad about yourself - or that you are a failure or have let yourself or your family down -   Trouble concentrating on things, such as reading the newspaper or watching television -   Moving or speaking so slowly that other people could have noticed. Or the opposite - being so fidgety or restless that you have been moving around a lot more than usual -   Thoughts that you would be better off dead, or of hurting yourself in some way -   Total Score 0   If you checked off any problems, how difficult have these problems made it for you to do your work, take care of things at home, or get along with other people? -

## 2021-03-04 ENCOUNTER — TELEPHONE (OUTPATIENT)
Dept: FAMILY MEDICINE CLINIC | Facility: CLINIC | Age: 51
End: 2021-03-04

## 2021-03-04 DIAGNOSIS — M79.672 LEFT FOOT PAIN: ICD-10-CM

## 2021-03-04 DIAGNOSIS — M10.072 ACUTE IDIOPATHIC GOUT OF LEFT FOOT: Primary | ICD-10-CM

## 2021-03-04 NOTE — TELEPHONE ENCOUNTER
Patient called and states that her foot is not any better since taking the dose pack, what should she do

## 2021-03-15 ENCOUNTER — TELEPHONE (OUTPATIENT)
Dept: PODIATRY | Facility: CLINIC | Age: 51
End: 2021-03-15

## 2021-03-15 ENCOUNTER — OFFICE VISIT (OUTPATIENT)
Dept: PODIATRY | Facility: CLINIC | Age: 51
End: 2021-03-15

## 2021-03-15 VITALS — OXYGEN SATURATION: 99 % | HEIGHT: 64 IN | WEIGHT: 271 LBS | HEART RATE: 76 BPM | BODY MASS INDEX: 46.26 KG/M2

## 2021-03-15 DIAGNOSIS — G47.10 HYPERSOMNIA: Chronic | ICD-10-CM

## 2021-03-15 DIAGNOSIS — M79.672 LEFT FOOT PAIN: Primary | ICD-10-CM

## 2021-03-15 DIAGNOSIS — M84.375A STRESS FRACTURE OF METATARSAL BONE OF LEFT FOOT, INITIAL ENCOUNTER: ICD-10-CM

## 2021-03-15 PROCEDURE — 99204 OFFICE O/P NEW MOD 45 MIN: CPT | Performed by: PODIATRIST

## 2021-03-15 NOTE — PROGRESS NOTES
My Hart  1970  50 y.o. female     Patient came to clinic for concern of left foot pain. Patient states her pain is 3/10. Patient states her pain is on the dorsal part of her foot. Xray obtained today     03/15/2021    Chief Complaint   Patient presents with   • Left Foot - Pain       History of Present Illness    My Hart is a 50 y.o.female who presents to clinic today with chief complaint of left foot pain.  Her pain started approximately 3 weeks ago.  There are no associated injuries or trauma.  Currently rates her pain as a 4 out of 10.  Pain is aggravated with weightbearing.  She describes the pain as stabbing.  There is associated swelling.  She has no other complaints.    Past Medical History:   Diagnosis Date   • Allergic    • Allergic rhinitis    • Depression    • GERD (gastroesophageal reflux disease)    • History of colon polyps    • Hyperlipidemia    • Hypothyroidism    • Impaired glucose tolerance    • Non-alcoholic fatty liver disease    • Pancreatitis    • Thyroid disease    • Tuberculosis    • URI (upper respiratory infection)    • UTI (urinary tract infection)          Past Surgical History:   Procedure Laterality Date   • COLONOSCOPY      Normal   • DILATATION AND CURETTAGE     • ENDOSCOPY      Esophagitis seen. Biopsy taken. Gastritis in stomach. Biopsy taken. Normal duodenum. Biopsy taken.)   • ENDOSCOPY AND COLONOSCOPY      2 polyps in sigmoid colon; all polyps removed by snare cautery polypectomy.   • HYSTERECTOMY     • INJECTION OF MEDICATION      Depo Medrol (80 mg)   • MAMMO HISTORICAL RESULT      03/2016  in [place]   • PAP SMEAR           Family History   Problem Relation Age of Onset   • Heart disease Mother    • Clotting disorder Mother    • Cancer Mother    • Thyroid disease Sister    • Heart disease Brother    • Cancer Maternal Grandmother         lung   • Diabetes Paternal Grandmother    • Thyroid disease Sister    • Heart disease Sister        No  Known Allergies    Social History     Socioeconomic History   • Marital status: Legally      Spouse name: Not on file   • Number of children: Not on file   • Years of education: Not on file   • Highest education level: Not on file   Tobacco Use   • Smoking status: Former Smoker     Quit date:      Years since quittin.2   • Smokeless tobacco: Never Used   Vaping Use   • Vaping Use: Never used   Substance and Sexual Activity   • Alcohol use: Yes     Comment: occasional   • Drug use: No   • Sexual activity: Defer         Current Outpatient Medications   Medication Sig Dispense Refill   • acyclovir (ZOVIRAX) 400 MG tablet Take 1 tablet by mouth 2 (Two) Times a Day. Take no more than 5 doses a day. 60 tablet 5   • Azelaic Acid 15 % foam      • Euthyrox 88 MCG tablet Take 1 tablet by mouth once daily 30 tablet 3   • FLUoxetine (PROzac) 10 MG capsule Take 1 capsule by mouth Daily. 30 capsule 5   • FLUoxetine (PROzac) 20 MG capsule Take 1 capsule by mouth Daily. Take with the 10mg tablet 30 capsule 5   • lamoTRIgine (LaMICtal) 100 MG tablet Take 1 tablet by mouth Daily. 30 tablet 5   • Latuda 60 MG tablet tablet Take 1 tablet by mouth once daily 30 tablet 3   • methylPREDNISolone (MEDROL) 4 MG dose pack Take as directed on package instructions. 1 each 0   • nitrofurantoin, macrocrystal-monohydrate, (MACROBID) 100 MG capsule Take 1 capsule by mouth once daily 30 capsule 3   • omeprazole (PriLOSEC) 20 MG capsule Take 1 capsule by mouth daily. 90 capsule 3   • diclofenac (VOLTAREN) 50 MG EC tablet Take 1 tablet by mouth 2 (Two) Times a Day. 60 tablet 1   • lisdexamfetamine (VYVANSE) 50 MG capsule Take 1 capsule by mouth Every Morning 30 capsule 0     No current facility-administered medications for this visit.       Review of Systems   Constitutional: Negative.    HENT: Negative.    Eyes: Negative.    Respiratory: Negative.    Cardiovascular: Negative.    Gastrointestinal: Negative.    Endocrine: Negative.   "  Genitourinary: Negative.    Musculoskeletal:        Foot pain      Skin: Negative.    Allergic/Immunologic: Negative.    Neurological: Negative.    Hematological: Negative.    Psychiatric/Behavioral: Negative.          OBJECTIVE    Pulse 76   Ht 162.6 cm (64\")   Wt 123 kg (271 lb)   SpO2 99%   BMI 46.52 kg/m²     Physical Exam  Cardiovascular:      Pulses:           Dorsalis pedis pulses are 2+ on the left side.        Posterior tibial pulses are 2+ on the left side.   Feet:      Left foot:      Skin integrity: Warmth present.      Toenail Condition: Left toenails are normal.          Foot/Ankle Exam:       General:   Appearance: appears stated age and healthy    Orientation: AAOx3    Affect: appropriate    Gait: antalgic    Shoe Gear:  Casual shoes    VASCULAR      Left Foot Vascularity   Normal vascular exam    Dorsalis pedis:  2+  Posterior tibial:  2+  Skin Temperature: warm    Edema Grading:  Non-pitting  CFT:  < 3 seconds  Pedal Hair Growth:  Present      NEUROLOGIC     Left Foot Neurologic   Normal sensation    Light touch sensation:  Normal     MUSCULOSKELETAL      Left Foot Musculoskeletal   Ecchymosis:  None  Tenderness: 2nd MTJP    Tenderness comment:  Along dorsal second metatarsal     MUSCLE STRENGTH     Left Foot Muscle Strength   Normal strength    Foot dorsiflexion:  5  Foot plantar flexion:  5  Foot inversion:  5  Foot eversion:  5     DERMATOLOGIC     Left Foot Dermatologic   Skin: skin intact    Nails: normal            Procedures        ASSESSMENT AND PLAN    Diagnoses and all orders for this visit:    1. Left foot pain (Primary)  -     XR Foot 3+ View Left  -     MRI Foot Left Without Contrast; Future    2. Stress fracture of metatarsal bone of left foot, initial encounter    Other orders  -     diclofenac (VOLTAREN) 50 MG EC tablet; Take 1 tablet by mouth 2 (Two) Times a Day.  Dispense: 60 tablet; Refill: 1        - Comprehensive foot and ankle exam performed.   -Radiographs taken " reviewed.  No acute osseous or articular malleus noted.  -Concern for stress fracture.  Will obtain MRI to rule out stress fracture left foot.  -I dispensed a cam boot.  Weightbearing as cam boot only.  -Rx for Voltaren  - All questions were answered to the patients satisfaction.  - RTC after MRI            This document has been electronically signed by Mo Weathers DPM on March 17, 2021 12:58 CDT     3/17/2021  12:58 CDT

## 2021-03-15 NOTE — TELEPHONE ENCOUNTER
Please put Ms Hart down for Monday March 29 @ 8:30 for MRI results.    She is aware of the date and time.  Thank you  Suzanne

## 2021-03-25 ENCOUNTER — HOSPITAL ENCOUNTER (OUTPATIENT)
Dept: MRI IMAGING | Facility: HOSPITAL | Age: 51
Discharge: HOME | End: 2021-03-25
Admitting: PODIATRIST

## 2021-03-25 DIAGNOSIS — M79.672 LEFT FOOT PAIN: ICD-10-CM

## 2021-03-25 PROCEDURE — 73718 MRI LOWER EXTREMITY W/O DYE: CPT

## 2021-03-29 ENCOUNTER — OFFICE VISIT (OUTPATIENT)
Dept: PODIATRY | Facility: CLINIC | Age: 51
End: 2021-03-29

## 2021-03-29 VITALS — HEIGHT: 64 IN | OXYGEN SATURATION: 100 % | BODY MASS INDEX: 46.26 KG/M2 | WEIGHT: 271 LBS | HEART RATE: 84 BPM

## 2021-03-29 DIAGNOSIS — M84.375D STRESS FRACTURE OF METATARSAL BONE OF LEFT FOOT WITH ROUTINE HEALING, SUBSEQUENT ENCOUNTER: Primary | ICD-10-CM

## 2021-03-29 PROCEDURE — 99213 OFFICE O/P EST LOW 20 MIN: CPT | Performed by: PODIATRIST

## 2021-03-29 NOTE — PROGRESS NOTES
My Hart  1970  50 y.o. female     Patient presents today for MRI results of her left foot.    03/29/2021     Chief Complaint   Patient presents with   • Left Foot - MRI results, Pain       History of Present Illness    My Hart is a 50 y.o.female who presents to clinic today for follow-up.  She is ambulating in the cam boot.  States that the cam boot helps with her pain.  She has had her MRI.  She denies any new complaints since her last office visit.    Past Medical History:   Diagnosis Date   • Allergic    • Allergic rhinitis    • Depression    • GERD (gastroesophageal reflux disease)    • History of colon polyps    • Hyperlipidemia    • Hypothyroidism    • Impaired glucose tolerance    • Non-alcoholic fatty liver disease    • Pancreatitis    • Thyroid disease    • Tuberculosis    • URI (upper respiratory infection)    • UTI (urinary tract infection)          Past Surgical History:   Procedure Laterality Date   • COLONOSCOPY      Normal   • DILATATION AND CURETTAGE     • ENDOSCOPY      Esophagitis seen. Biopsy taken. Gastritis in stomach. Biopsy taken. Normal duodenum. Biopsy taken.)   • ENDOSCOPY AND COLONOSCOPY      2 polyps in sigmoid colon; all polyps removed by snare cautery polypectomy.   • HYSTERECTOMY     • INJECTION OF MEDICATION      Depo Medrol (80 mg)   • MAMMO HISTORICAL RESULT      03/2016  in [place]   • PAP SMEAR           Family History   Problem Relation Age of Onset   • Heart disease Mother    • Clotting disorder Mother    • Cancer Mother    • Thyroid disease Sister    • Heart disease Brother    • Cancer Maternal Grandmother         lung   • Diabetes Paternal Grandmother    • Thyroid disease Sister    • Heart disease Sister        No Known Allergies    Social History     Socioeconomic History   • Marital status:      Spouse name: Not on file   • Number of children: Not on file   • Years of education: Not on file   • Highest education level: Not on file  "  Tobacco Use   • Smoking status: Former Smoker     Quit date:      Years since quittin.2   • Smokeless tobacco: Never Used   Vaping Use   • Vaping Use: Never used   Substance and Sexual Activity   • Alcohol use: Yes     Comment: occasional   • Drug use: No   • Sexual activity: Defer         Current Outpatient Medications   Medication Sig Dispense Refill   • acyclovir (ZOVIRAX) 400 MG tablet Take 1 tablet by mouth 2 (Two) Times a Day. Take no more than 5 doses a day. 60 tablet 5   • Azelaic Acid 15 % foam      • diclofenac (VOLTAREN) 50 MG EC tablet Take 1 tablet by mouth 2 (Two) Times a Day. 60 tablet 1   • Euthyrox 88 MCG tablet Take 1 tablet by mouth once daily 30 tablet 3   • FLUoxetine (PROzac) 10 MG capsule Take 1 capsule by mouth Daily. 30 capsule 5   • FLUoxetine (PROzac) 20 MG capsule Take 1 capsule by mouth Daily. Take with the 10mg tablet 30 capsule 5   • lamoTRIgine (LaMICtal) 100 MG tablet Take 1 tablet by mouth Daily. 30 tablet 5   • Latuda 60 MG tablet tablet Take 1 tablet by mouth once daily 30 tablet 3   • lisdexamfetamine (VYVANSE) 50 MG capsule Take 1 capsule by mouth Every Morning 30 capsule 0   • methylPREDNISolone (MEDROL) 4 MG dose pack Take as directed on package instructions. 1 each 0   • nitrofurantoin, macrocrystal-monohydrate, (MACROBID) 100 MG capsule Take 1 capsule by mouth once daily 30 capsule 3   • omeprazole (PriLOSEC) 20 MG capsule Take 1 capsule by mouth daily. 90 capsule 3     No current facility-administered medications for this visit.       Review of Systems   Constitutional: Negative.    HENT: Negative.    Respiratory: Negative.    Cardiovascular: Negative.    Gastrointestinal: Negative.    Musculoskeletal:        Foot pain      Skin: Negative.    Allergic/Immunologic: Negative.    Neurological: Negative.    Psychiatric/Behavioral: Negative.          OBJECTIVE    Pulse 84   Ht 162.6 cm (64\")   Wt 123 kg (271 lb)   SpO2 100%   BMI 46.52 kg/m²          Foot/Ankle " Exam:       General:   Appearance: appears stated age and healthy    Orientation: AAOx3    Affect: appropriate    Gait: antalgic    Shoe Gear:  Casual shoes    VASCULAR      Left Foot Vascularity   Normal vascular exam    Dorsalis pedis:  2+  Posterior tibial:  2+  Skin Temperature: warm    Edema Grading:  Non-pitting  CFT:  < 3 seconds  Pedal Hair Growth:  Present      NEUROLOGIC     Left Foot Neurologic   Normal sensation    Light touch sensation:  Normal     MUSCULOSKELETAL      Left Foot Musculoskeletal   Ecchymosis:  None  Tenderness: 2nd MTJP    Tenderness comment:  Along dorsal second metatarsal     MUSCLE STRENGTH     Left Foot Muscle Strength   Normal strength    Foot dorsiflexion:  5  Foot plantar flexion:  5  Foot inversion:  5  Foot eversion:  5     DERMATOLOGIC     Left Foot Dermatologic   Skin: skin intact    Nails: normal            Procedures        ASSESSMENT AND PLAN    Diagnoses and all orders for this visit:    1. Stress fracture of metatarsal bone of left foot with routine healing, subsequent encounter (Primary)        -Left foot pain is improving.  MRI reviewed with patient.  Grade 3 stress response to distal second metatarsal.  Plan to keep patient in cam boot for an additional 2 weeks.  -Work note and [place] paperwork provided.  - All questions were answered to the patients satisfaction.  - RTC 2 weeks, repeat radiographs            This document has been electronically signed by Mo Weathers DPM on March 29, 2021 08:21 CDT     3/29/2021  08:21 CDT

## 2021-04-12 ENCOUNTER — OFFICE VISIT (OUTPATIENT)
Dept: PODIATRY | Facility: CLINIC | Age: 51
End: 2021-04-12

## 2021-04-12 VITALS — WEIGHT: 271 LBS | BODY MASS INDEX: 46.26 KG/M2 | OXYGEN SATURATION: 98 % | HEART RATE: 84 BPM | HEIGHT: 64 IN

## 2021-04-12 DIAGNOSIS — M84.375D STRESS FRACTURE OF METATARSAL BONE OF LEFT FOOT WITH ROUTINE HEALING, SUBSEQUENT ENCOUNTER: Primary | ICD-10-CM

## 2021-04-12 PROCEDURE — 99024 POSTOP FOLLOW-UP VISIT: CPT | Performed by: PODIATRIST

## 2021-04-12 NOTE — PROGRESS NOTES
My Hart  1970  50 y.o. female     2021     Chief Complaint   Patient presents with    Left Foot - fracture care       History of Present Illness    My Hart is a 50 y.o.female who presents to clinic today for follow-up.  She is ambulating in the cam boot.  She states that her pain has mostly resolved.  She is ready to return to work.    Past Medical History:   Diagnosis Date    Allergic     Allergic rhinitis     Depression     GERD (gastroesophageal reflux disease)     History of colon polyps     Hyperlipidemia     Hypothyroidism     Impaired glucose tolerance     Non-alcoholic fatty liver disease     Pancreatitis     Thyroid disease     Tuberculosis     URI (upper respiratory infection)     UTI (urinary tract infection)          Past Surgical History:   Procedure Laterality Date    COLONOSCOPY      Normal    DILATATION AND CURETTAGE      ENDOSCOPY      Esophagitis seen. Biopsy taken. Gastritis in stomach. Biopsy taken. Normal duodenum. Biopsy taken.)    ENDOSCOPY AND COLONOSCOPY      2 polyps in sigmoid colon; all polyps removed by snare cautery polypectomy.    HYSTERECTOMY      INJECTION OF MEDICATION      Depo Medrol (80 mg)    MAMMO HISTORICAL RESULT      2016  in [place]    PAP SMEAR           Family History   Problem Relation Age of Onset    Heart disease Mother     Clotting disorder Mother     Cancer Mother     Thyroid disease Sister     Heart disease Brother     Cancer Maternal Grandmother         lung    Diabetes Paternal Grandmother     Thyroid disease Sister     Heart disease Sister        No Known Allergies    Social History     Socioeconomic History    Marital status:      Spouse name: Not on file    Number of children: Not on file    Years of education: Not on file    Highest education level: Not on file   Tobacco Use    Smoking status: Former Smoker     Quit date:      Years since quittin.2    Smokeless tobacco: Never Used   Vaping Use     "Vaping Use: Never used   Substance and Sexual Activity    Alcohol use: Yes     Comment: occasional    Drug use: No    Sexual activity: Defer         Current Outpatient Medications   Medication Sig Dispense Refill    acyclovir (ZOVIRAX) 400 MG tablet Take 1 tablet by mouth 2 (Two) Times a Day. Take no more than 5 doses a day. 60 tablet 5    Azelaic Acid 15 % foam       diclofenac (VOLTAREN) 50 MG EC tablet Take 1 tablet by mouth 2 (Two) Times a Day. 60 tablet 1    Euthyrox 88 MCG tablet Take 1 tablet by mouth once daily 30 tablet 3    FLUoxetine (PROzac) 10 MG capsule Take 1 capsule by mouth Daily. 30 capsule 5    FLUoxetine (PROzac) 20 MG capsule Take 1 capsule by mouth Daily. Take with the 10mg tablet 30 capsule 5    lamoTRIgine (LaMICtal) 100 MG tablet Take 1 tablet by mouth Daily. 30 tablet 5    Latuda 60 MG tablet tablet Take 1 tablet by mouth once daily 30 tablet 3    lisdexamfetamine (VYVANSE) 50 MG capsule Take 1 capsule by mouth Every Morning 30 capsule 0    nitrofurantoin, macrocrystal-monohydrate, (MACROBID) 100 MG capsule Take 1 capsule by mouth once daily 30 capsule 3    omeprazole (PriLOSEC) 20 MG capsule Take 1 capsule by mouth daily. 90 capsule 3    methylPREDNISolone (MEDROL) 4 MG dose pack Take as directed on package instructions. 1 each 0     No current facility-administered medications for this visit.       Review of Systems   Constitutional: Negative.    HENT: Negative.    Respiratory: Negative.    Cardiovascular: Negative.    Gastrointestinal: Negative.    Musculoskeletal: Negative.    Skin: Negative.    Psychiatric/Behavioral: Negative.          OBJECTIVE    Pulse 84   Ht 162.6 cm (64\")   Wt 123 kg (271 lb)   SpO2 98%   BMI 46.52 kg/m²          Foot/Ankle Exam:       General:   Appearance: appears stated age and healthy    Orientation: AAOx3    Affect: appropriate    Gait: antalgic    Shoe Gear:  Casual shoes    VASCULAR      Left Foot Vascularity   Normal vascular exam    Dorsalis pedis:  " 2+  Posterior tibial:  2+  Skin Temperature: warm    Edema Grading:  Non-pitting  CFT:  < 3 seconds  Pedal Hair Growth:  Present      NEUROLOGIC     Left Foot Neurologic   Normal sensation    Light touch sensation:  Normal     MUSCULOSKELETAL      Left Foot Musculoskeletal   Ecchymosis:  None  Tenderness: none       MUSCLE STRENGTH     Left Foot Muscle Strength   Normal strength    Foot dorsiflexion:  5  Foot plantar flexion:  5  Foot inversion:  5  Foot eversion:  5     DERMATOLOGIC     Left Foot Dermatologic   Skin: skin intact    Nails: normal            Procedures        ASSESSMENT AND PLAN    Diagnoses and all orders for this visit:    1. Stress fracture of metatarsal bone of left foot with routine healing, subsequent encounter (Primary)  -     XR Foot 3+ View Left        -Patient is doing very well.  Repeat radiographs taken and reviewed today.  -Transition to athletic shoe gear.  Return to work note provided.  - All questions were answered to the patients satisfaction.  - RTC as needed            This document has been electronically signed by Suzanne Kaiser on April 12, 2021 08:15 CDT     4/12/2021  08:15 CDT

## 2021-04-20 ENCOUNTER — OFFICE VISIT (OUTPATIENT)
Dept: FAMILY MEDICINE CLINIC | Facility: CLINIC | Age: 51
End: 2021-04-20

## 2021-04-20 VITALS
HEIGHT: 64 IN | WEIGHT: 271 LBS | DIASTOLIC BLOOD PRESSURE: 84 MMHG | BODY MASS INDEX: 46.26 KG/M2 | HEART RATE: 89 BPM | SYSTOLIC BLOOD PRESSURE: 128 MMHG | OXYGEN SATURATION: 99 %

## 2021-04-20 DIAGNOSIS — F33.42 RECURRENT MAJOR DEPRESSIVE DISORDER, IN FULL REMISSION (HCC): Chronic | ICD-10-CM

## 2021-04-20 DIAGNOSIS — G47.10 HYPERSOMNIA: Primary | Chronic | ICD-10-CM

## 2021-04-20 DIAGNOSIS — F41.1 GENERALIZED ANXIETY DISORDER: Chronic | ICD-10-CM

## 2021-04-20 DIAGNOSIS — F31.76 BIPOLAR DISORDER, IN FULL REMISSION, MOST RECENT EPISODE DEPRESSED (HCC): Chronic | ICD-10-CM

## 2021-04-20 PROCEDURE — 99214 OFFICE O/P EST MOD 30 MIN: CPT | Performed by: INTERNAL MEDICINE

## 2021-04-20 NOTE — PROGRESS NOTES
"  Chief Complaint   Patient presents with   • Hypothyroidism     3 month med refill    • Depression     Subjective   My Hart is a 50 y.o. female who presents to the office for follow-up.  She has a diagnosis of bipolar disorder and hypersomnia.  She takes fluoxetine 30 mg daily, Latuda 60 mg daily, Lamictal 100 mg daily, and Vyvanse 50 mg each morning.  Her symptoms have been well managed with this combination of medications.  These were initially started by her psychiatrist.  She has anxiety and depression which has been doing well.  Her depression is in full remission.    The following portions of the patient's history were reviewed and updated as appropriate: allergies, current medications, past family history, past medical history, past social history, past surgical history and problem list.    Review of Systems   Constitutional: Negative for chills, fatigue and fever.   HENT: Negative for congestion, sneezing, sore throat and trouble swallowing.    Eyes: Negative for visual disturbance.   Respiratory: Negative for cough, chest tightness, shortness of breath and wheezing.    Cardiovascular: Negative for chest pain, palpitations and leg swelling.   Gastrointestinal: Negative for abdominal pain, constipation, diarrhea, nausea and vomiting.   Genitourinary: Negative for dysuria, frequency and urgency.   Musculoskeletal: Negative for neck pain.   Skin: Negative for rash.   Neurological: Negative for dizziness, weakness and headaches.   Psychiatric/Behavioral:        Patient denies any feelings of depression and has not felt down, hopeless or lost interest in any activities.   All other systems reviewed and are negative.  Review of systems has been reviewed and validated on 04/20/2021and updated with any changes.       Objective   Vitals:    04/20/21 1030   BP: 128/84   Pulse: 89   SpO2: 99%   Weight: 123 kg (271 lb)   Height: 162.6 cm (64\")   PainSc: 0-No pain     Body mass index is 46.52 " kg/m².    Physical Exam   Constitutional: She is oriented to person, place, and time. She appears well-developed.   HENT:   Head: Normocephalic and atraumatic.   Eyes: Pupils are equal, round, and reactive to light. Conjunctivae are normal. Right eye exhibits no discharge. Left eye exhibits no discharge. No scleral icterus.   Neck: No thyromegaly present.   Cardiovascular: Normal rate, regular rhythm and normal heart sounds. Exam reveals no gallop and no friction rub.   No murmur heard.  Pulmonary/Chest: Effort normal and breath sounds normal. No respiratory distress. She has no wheezes. She has no rales.   Lymphadenopathy:     She has no cervical adenopathy.   Neurological: She is alert and oriented to person, place, and time. No cranial nerve deficit.   Skin: Skin is warm and dry. No rash noted.   Psychiatric: Her behavior is normal. Judgment and thought content normal.   Nursing note and vitals reviewed.        Assessment/Plan             Diagnoses and all orders for this visit:    1. Hypersomnia (Primary)  -     lisdexamfetamine (VYVANSE) 50 MG capsule; Take 1 capsule by mouth Every Morning  Dispense: 30 capsule; Refill: 0    2. Bipolar disorder, in full remission, most recent episode depressed (CMS/HCC)    3. Recurrent major depressive disorder, in full remission (CMS/HCC)    4. Generalized anxiety disorder         She will continue with fluoxetine 30 mg daily, Latuda 60 mg daily, Lamictal 100 mg daily, and Vyvanse 50 mg daily for treatment of her bipolar disorder, hypersomnia, anxiety and depression.    Patient understands the risks associated with this controlled medication, including tolerance and addiction.  Patient also agrees to only obtain this medication from me (until she establishes care with psychiatry), and not from a another provider, unless that provider is covering for me in my absence.  Patient also agrees to be compliant in dosing, and not self adjust the dose of medication.  A signed  controlled substance agreement is on file, and the patient has received a controlled substance education sheet at this or a previous visit.  The patient has also signed a consent for treatment with a controlled substance as per [facility] policy. KYLEIGH is obtained.    Patient's Body mass index is 46.52 kg/m². BMI is above normal parameters. Recommendations include: educational material.      PHQ-2/PHQ-9 Depression Screening 2/25/2021   Little interest or pleasure in doing things 0   Feeling down, depressed, or hopeless 0   Trouble falling or staying asleep, or sleeping too much -   Feeling tired or having little energy -   Poor appetite or overeating -   Feeling bad about yourself - or that you are a failure or have let yourself or your family down -   Trouble concentrating on things, such as reading the newspaper or watching television -   Moving or speaking so slowly that other people could have noticed. Or the opposite - being so fidgety or restless that you have been moving around a lot more than usual -   Thoughts that you would be better off dead, or of hurting yourself in some way -   Total Score 0   If you checked off any problems, how difficult have these problems made it for you to do your work, take care of things at home, or get along with other people? -

## 2021-05-27 ENCOUNTER — TELEPHONE (OUTPATIENT)
Dept: FAMILY MEDICINE CLINIC | Facility: CLINIC | Age: 51
End: 2021-05-27

## 2021-05-27 DIAGNOSIS — G47.10 HYPERSOMNIA: Chronic | ICD-10-CM

## 2021-05-31 DIAGNOSIS — E03.9 ACQUIRED HYPOTHYROIDISM: Chronic | ICD-10-CM

## 2021-06-01 RX ORDER — LEVOTHYROXINE SODIUM 88 UG/1
TABLET ORAL
Qty: 30 TABLET | Refills: 5 | Status: SHIPPED | OUTPATIENT
Start: 2021-06-01

## 2021-07-02 ENCOUNTER — TELEPHONE (OUTPATIENT)
Dept: FAMILY MEDICINE CLINIC | Facility: CLINIC | Age: 51
End: 2021-07-02

## 2021-07-02 DIAGNOSIS — G47.10 HYPERSOMNIA: Chronic | ICD-10-CM

## 2021-07-11 DIAGNOSIS — N39.0 CHRONIC URINARY TRACT INFECTION: Chronic | ICD-10-CM

## 2021-07-12 RX ORDER — NITROFURANTOIN 25; 75 MG/1; MG/1
CAPSULE ORAL
Qty: 30 CAPSULE | Refills: 0 | OUTPATIENT
Start: 2021-07-12

## 2021-07-13 DIAGNOSIS — N39.0 CHRONIC URINARY TRACT INFECTION: Chronic | ICD-10-CM

## 2021-07-13 RX ORDER — NITROFURANTOIN 25; 75 MG/1; MG/1
CAPSULE ORAL
Qty: 30 CAPSULE | Refills: 0 | OUTPATIENT
Start: 2021-07-13

## 2023-08-29 ENCOUNTER — TRANSCRIBE ORDERS (OUTPATIENT)
Dept: LAB | Facility: OTHER | Age: 53
End: 2023-08-29
Payer: COMMERCIAL

## 2023-08-29 ENCOUNTER — LAB (OUTPATIENT)
Dept: LAB | Facility: OTHER | Age: 53
End: 2023-08-29
Payer: COMMERCIAL

## 2023-08-29 DIAGNOSIS — F31.9 BIPOLAR AFFECTIVE DISORDER, REMISSION STATUS UNSPECIFIED: ICD-10-CM

## 2023-08-29 DIAGNOSIS — F33.1 MAJOR DEPRESSIVE DISORDER, RECURRENT, MODERATE: ICD-10-CM

## 2023-08-29 DIAGNOSIS — E03.9 HYPOTHYROIDISM, UNSPECIFIED TYPE: ICD-10-CM

## 2023-08-29 DIAGNOSIS — E66.09 OTHER OBESITY DUE TO EXCESS CALORIES: ICD-10-CM

## 2023-08-29 DIAGNOSIS — M62.831 MUSCLE SPASM OF CALF: ICD-10-CM

## 2023-08-29 DIAGNOSIS — Z79.899 OTHER LONG TERM (CURRENT) DRUG THERAPY: ICD-10-CM

## 2023-08-29 DIAGNOSIS — R73.03 PREDIABETES: ICD-10-CM

## 2023-08-29 DIAGNOSIS — M48.00 SPINAL STENOSIS, UNSPECIFIED SPINAL REGION: ICD-10-CM

## 2023-08-29 DIAGNOSIS — R73.03 PREDIABETES: Primary | ICD-10-CM

## 2023-08-29 DIAGNOSIS — G47.00 INSOMNIA, UNSPECIFIED TYPE: ICD-10-CM

## 2023-08-29 LAB
ALBUMIN SERPL-MCNC: 4.3 G/DL (ref 3.5–5)
ALBUMIN/GLOB SERPL: 1.5 G/DL (ref 1.1–1.8)
ALP SERPL-CCNC: 110 U/L (ref 38–126)
ALT SERPL W P-5'-P-CCNC: 60 U/L
ANION GAP SERPL CALCULATED.3IONS-SCNC: 7 MMOL/L (ref 5–15)
AST SERPL-CCNC: 42 U/L (ref 14–36)
BASOPHILS # BLD AUTO: 0.07 10*3/MM3 (ref 0–0.2)
BASOPHILS NFR BLD AUTO: 0.8 % (ref 0–1.5)
BILIRUB SERPL-MCNC: 0.9 MG/DL (ref 0–1.2)
BUN SERPL-MCNC: 11 MG/DL (ref 7–23)
BUN/CREAT SERPL: 10.2 (ref 7–25)
CALCIUM SPEC-SCNC: 8.8 MG/DL (ref 8.4–10.2)
CHLORIDE SERPL-SCNC: 101 MMOL/L (ref 101–112)
CO2 SERPL-SCNC: 28 MMOL/L (ref 22–30)
CREAT SERPL-MCNC: 1.08 MG/DL (ref 0.52–1.04)
DEPRECATED RDW RBC AUTO: 42.3 FL (ref 37–54)
EGFRCR SERPLBLD CKD-EPI 2021: 62.3 ML/MIN/1.73
EOSINOPHIL # BLD AUTO: 0.22 10*3/MM3 (ref 0–0.4)
EOSINOPHIL NFR BLD AUTO: 2.6 % (ref 0.3–6.2)
ERYTHROCYTE [DISTWIDTH] IN BLOOD BY AUTOMATED COUNT: 12.6 % (ref 12.3–15.4)
GLOBULIN UR ELPH-MCNC: 2.9 GM/DL (ref 2.3–3.5)
GLUCOSE SERPL-MCNC: 104 MG/DL (ref 70–99)
HCT VFR BLD AUTO: 39.4 % (ref 34–46.6)
HGB BLD-MCNC: 13.3 G/DL (ref 12–15.9)
LYMPHOCYTES # BLD AUTO: 3.41 10*3/MM3 (ref 0.7–3.1)
LYMPHOCYTES NFR BLD AUTO: 40.2 % (ref 19.6–45.3)
MAGNESIUM SERPL-MCNC: 2.2 MG/DL (ref 1.6–2.3)
MCH RBC QN AUTO: 31.9 PG (ref 26.6–33)
MCHC RBC AUTO-ENTMCNC: 33.8 G/DL (ref 31.5–35.7)
MCV RBC AUTO: 94.5 FL (ref 79–97)
MONOCYTES # BLD AUTO: 0.61 10*3/MM3 (ref 0.1–0.9)
MONOCYTES NFR BLD AUTO: 7.2 % (ref 5–12)
NEUTROPHILS NFR BLD AUTO: 4.18 10*3/MM3 (ref 1.7–7)
NEUTROPHILS NFR BLD AUTO: 49.2 % (ref 42.7–76)
PLATELET # BLD AUTO: 313 10*3/MM3 (ref 140–450)
PMV BLD AUTO: 9.6 FL (ref 6–12)
POTASSIUM SERPL-SCNC: 4.1 MMOL/L (ref 3.4–5)
PROT SERPL-MCNC: 7.2 G/DL (ref 6.3–8.6)
RBC # BLD AUTO: 4.17 10*6/MM3 (ref 3.77–5.28)
SODIUM SERPL-SCNC: 136 MMOL/L (ref 137–145)
WBC NRBC COR # BLD: 8.49 10*3/MM3 (ref 3.4–10.8)

## 2023-08-29 PROCEDURE — 80050 GENERAL HEALTH PANEL: CPT | Performed by: INTERNAL MEDICINE

## 2023-08-29 PROCEDURE — 83036 HEMOGLOBIN GLYCOSYLATED A1C: CPT | Performed by: NURSE PRACTITIONER

## 2023-08-29 PROCEDURE — 83735 ASSAY OF MAGNESIUM: CPT | Performed by: INTERNAL MEDICINE

## 2023-08-29 PROCEDURE — 36415 COLL VENOUS BLD VENIPUNCTURE: CPT | Performed by: INTERNAL MEDICINE

## 2023-08-30 LAB
HBA1C MFR BLD: 6.2 % (ref 4.8–5.6)
TSH SERPL DL<=0.05 MIU/L-ACNC: 3.47 UIU/ML (ref 0.27–4.2)